# Patient Record
Sex: MALE | Race: WHITE | Employment: FULL TIME | URBAN - METROPOLITAN AREA
[De-identification: names, ages, dates, MRNs, and addresses within clinical notes are randomized per-mention and may not be internally consistent; named-entity substitution may affect disease eponyms.]

---

## 2019-03-29 ENCOUNTER — OFFICE VISIT (OUTPATIENT)
Dept: FAMILY MEDICINE CLINIC | Age: 54
End: 2019-03-29
Payer: COMMERCIAL

## 2019-03-29 VITALS
HEART RATE: 55 BPM | SYSTOLIC BLOOD PRESSURE: 138 MMHG | BODY MASS INDEX: 27.22 KG/M2 | HEIGHT: 72 IN | DIASTOLIC BLOOD PRESSURE: 86 MMHG | WEIGHT: 201 LBS

## 2019-03-29 DIAGNOSIS — E78.5 HYPERLIPIDEMIA, UNSPECIFIED HYPERLIPIDEMIA TYPE: ICD-10-CM

## 2019-03-29 DIAGNOSIS — N52.9 ERECTILE DYSFUNCTION, UNSPECIFIED ERECTILE DYSFUNCTION TYPE: Primary | ICD-10-CM

## 2019-03-29 DIAGNOSIS — I25.10 CORONARY ARTERY DISEASE WITHOUT ANGINA PECTORIS, UNSPECIFIED VESSEL OR LESION TYPE, UNSPECIFIED WHETHER NATIVE OR TRANSPLANTED HEART: ICD-10-CM

## 2019-03-29 DIAGNOSIS — K21.00 GASTROESOPHAGEAL REFLUX DISEASE WITH ESOPHAGITIS: ICD-10-CM

## 2019-03-29 LAB
A/G RATIO: 2 (ref 1.1–2.2)
ALBUMIN SERPL-MCNC: 4.5 G/DL (ref 3.4–5)
ALP BLD-CCNC: 73 U/L (ref 40–129)
ALT SERPL-CCNC: 22 U/L (ref 10–40)
ANION GAP SERPL CALCULATED.3IONS-SCNC: 12 MMOL/L (ref 3–16)
AST SERPL-CCNC: 28 U/L (ref 15–37)
BILIRUB SERPL-MCNC: 2 MG/DL (ref 0–1)
BUN BLDV-MCNC: 11 MG/DL (ref 7–20)
CALCIUM SERPL-MCNC: 9.4 MG/DL (ref 8.3–10.6)
CHLORIDE BLD-SCNC: 105 MMOL/L (ref 99–110)
CO2: 25 MMOL/L (ref 21–32)
CREAT SERPL-MCNC: 0.7 MG/DL (ref 0.9–1.3)
GFR AFRICAN AMERICAN: >60
GFR NON-AFRICAN AMERICAN: >60
GLOBULIN: 2.3 G/DL
GLUCOSE BLD-MCNC: 90 MG/DL (ref 70–99)
HCT VFR BLD CALC: 43.8 % (ref 40.5–52.5)
HEMOGLOBIN: 14.8 G/DL (ref 13.5–17.5)
MCH RBC QN AUTO: 28.2 PG (ref 26–34)
MCHC RBC AUTO-ENTMCNC: 33.7 G/DL (ref 31–36)
MCV RBC AUTO: 83.8 FL (ref 80–100)
PDW BLD-RTO: 13.4 % (ref 12.4–15.4)
PLATELET # BLD: 196 K/UL (ref 135–450)
PMV BLD AUTO: 8.9 FL (ref 5–10.5)
POTASSIUM SERPL-SCNC: 5.2 MMOL/L (ref 3.5–5.1)
RBC # BLD: 5.23 M/UL (ref 4.2–5.9)
SODIUM BLD-SCNC: 142 MMOL/L (ref 136–145)
TOTAL PROTEIN: 6.8 G/DL (ref 6.4–8.2)
TSH SERPL DL<=0.05 MIU/L-ACNC: 3.79 UIU/ML (ref 0.27–4.2)
WBC # BLD: 5.4 K/UL (ref 4–11)

## 2019-03-29 PROCEDURE — 1036F TOBACCO NON-USER: CPT | Performed by: FAMILY MEDICINE

## 2019-03-29 PROCEDURE — G8598 ASA/ANTIPLAT THER USED: HCPCS | Performed by: FAMILY MEDICINE

## 2019-03-29 PROCEDURE — G8419 CALC BMI OUT NRM PARAM NOF/U: HCPCS | Performed by: FAMILY MEDICINE

## 2019-03-29 PROCEDURE — 36415 COLL VENOUS BLD VENIPUNCTURE: CPT | Performed by: FAMILY MEDICINE

## 2019-03-29 PROCEDURE — 3017F COLORECTAL CA SCREEN DOC REV: CPT | Performed by: FAMILY MEDICINE

## 2019-03-29 PROCEDURE — G8427 DOCREV CUR MEDS BY ELIG CLIN: HCPCS | Performed by: FAMILY MEDICINE

## 2019-03-29 PROCEDURE — 99213 OFFICE O/P EST LOW 20 MIN: CPT | Performed by: FAMILY MEDICINE

## 2019-03-29 PROCEDURE — G8484 FLU IMMUNIZE NO ADMIN: HCPCS | Performed by: FAMILY MEDICINE

## 2019-03-29 RX ORDER — OMEPRAZOLE 20 MG/1
CAPSULE, DELAYED RELEASE ORAL
Refills: 11 | COMMUNITY
Start: 2019-03-26 | End: 2021-12-13

## 2019-03-29 SDOH — HEALTH STABILITY: MENTAL HEALTH: HOW MANY STANDARD DRINKS CONTAINING ALCOHOL DO YOU HAVE ON A TYPICAL DAY?: 1 OR 2

## 2019-03-29 SDOH — HEALTH STABILITY: MENTAL HEALTH: HOW OFTEN DO YOU HAVE A DRINK CONTAINING ALCOHOL?: 2-3 TIMES A WEEK

## 2019-03-29 ASSESSMENT — ENCOUNTER SYMPTOMS
BLOOD IN STOOL: 0
COUGH: 0
EYE DISCHARGE: 0
FACIAL SWELLING: 0
ANAL BLEEDING: 0
SINUS PRESSURE: 0
TROUBLE SWALLOWING: 0
SORE THROAT: 0
RHINORRHEA: 0
SHORTNESS OF BREATH: 0
NAUSEA: 0
ABDOMINAL PAIN: 0
DIARRHEA: 0
CHEST TIGHTNESS: 0
WHEEZING: 0
VOMITING: 0

## 2019-03-29 ASSESSMENT — PATIENT HEALTH QUESTIONNAIRE - PHQ9
1. LITTLE INTEREST OR PLEASURE IN DOING THINGS: 0
SUM OF ALL RESPONSES TO PHQ QUESTIONS 1-9: 0
SUM OF ALL RESPONSES TO PHQ9 QUESTIONS 1 & 2: 0
2. FEELING DOWN, DEPRESSED OR HOPELESS: 0
SUM OF ALL RESPONSES TO PHQ QUESTIONS 1-9: 0

## 2019-03-29 NOTE — PATIENT INSTRUCTIONS
Discuss PPI with GI. Keep evaluation next week with Cardiologist.   Labs were obtained and I will contact you via my chart. Continue to exercise and eat a balanced healthy diet. RTC in 3 months for follow up.

## 2019-03-29 NOTE — PROGRESS NOTES
3/29/2019    Nabila Flynn (:  1965) is a 49 y.o. male, presented to the clinic to establish care with a pcp and to discuss several ongoing concerns. He stated overall he feels well today. 1.  Nausea- patient stated that he follows with a GI specialist due to GERD and several months ago reduced his PPI to 20 mg a day. He stated that around this time he began to have occasional nausea, no vomiting or diarrhea. He is unable to distinguish if food helps or makes it worse. He does acknowledge it seems to occur more at night but he believes this is due to the pace of the day slowing down. He has thought perhaps increasing his PPI again would help but is concerned about side effects associated with the medication. 2.  CAD- follows with a Cardiologist.  Has a stent in left circumflex coronary artery and is stable at this time. Is wanting a new prescription for Nitroglycerin and has a follow up appointment with his specialist next week. 3.  ED- patient stated that this has been a developing issue. He started noticing that he is having problems obtaining an erection and maintaining one. He denied pain or discomfort with penis or testicles. Denied history of STD and denied lesion. He believes it is associated with his age. He has good muscle mass and hair growth. Today, he denied chest pain, sob,n, v, or diarrhea. HPI    Review of Systems   Constitutional: Negative for activity change, fatigue, fever and unexpected weight change. HENT: Negative for congestion, ear pain, facial swelling, hearing loss, rhinorrhea, sinus pressure, sore throat and trouble swallowing. Eyes: Negative for discharge and visual disturbance. Respiratory: Negative for cough, chest tightness, shortness of breath and wheezing. Cardiovascular: Negative for chest pain, palpitations and leg swelling. Gastrointestinal: Negative for abdominal pain, anal bleeding, blood in stool, diarrhea, nausea and vomiting. Endocrine: Negative for cold intolerance, heat intolerance, polydipsia and polyphagia. Genitourinary: Positive for urgency. Negative for decreased urine volume, discharge, dysuria, frequency, genital sores, penile pain and testicular pain. Musculoskeletal: Negative for arthralgias. Skin: Negative for rash. Allergic/Immunologic: Negative for food allergies. Neurological: Negative for dizziness, syncope, light-headedness and headaches. Hematological: Does not bruise/bleed easily. Psychiatric/Behavioral: Negative for suicidal ideas. The patient is not nervous/anxious. Prior to Visit Medications    Medication Sig Taking? Authorizing Provider   omeprazole (PRILOSEC) 20 MG delayed release capsule TK 1 C PO D Yes Historical Provider, MD   rosuvastatin (CRESTOR) 40 MG tablet Take 0.5 tablets by mouth daily. Yes Renuka Ruffin MD   ezetimibe (ZETIA) 10 MG tablet Take 10 mg by mouth daily. Yes Historical Provider, MD   aspirin 81 MG EC tablet Take 81 mg by mouth daily. Yes Historical Provider, MD   Cetirizine HCl (ZYRTEC PO) Take  by mouth. Yes Historical Provider, MD        No Known Allergies    Past Medical History:   Diagnosis Date    CAD (coronary artery disease) 4/10    ptca LAD Pelberg stent    Hyperlipidemia        Past Surgical History:   Procedure Laterality Date    APPENDECTOMY         Social History     Socioeconomic History    Marital status:      Spouse name: Not on file    Number of children: Not on file    Years of education: Not on file    Highest education level: Not on file   Occupational History    Not on file   Social Needs    Financial resource strain: Not on file    Food insecurity:     Worry: Not on file     Inability: Not on file    Transportation needs:     Medical: Not on file     Non-medical: Not on file   Tobacco Use    Smoking status: Never Smoker    Smokeless tobacco: Never Used   Substance and Sexual Activity    Alcohol use:  Yes     Alcohol/week: 2.5 oz     Types: 5 Standard drinks or equivalent per week     Frequency: 2-3 times a week     Drinks per session: 1 or 2    Drug use: No    Sexual activity: Yes     Partners: Female   Lifestyle    Physical activity:     Days per week: Not on file     Minutes per session: Not on file    Stress: Not on file   Relationships    Social connections:     Talks on phone: Not on file     Gets together: Not on file     Attends Latter-day service: Not on file     Active member of club or organization: Not on file     Attends meetings of clubs or organizations: Not on file     Relationship status: Not on file    Intimate partner violence:     Fear of current or ex partner: Not on file     Emotionally abused: Not on file     Physically abused: Not on file     Forced sexual activity: Not on file   Other Topics Concern    Not on file   Social History Narrative    Not on file        Family History   Problem Relation Age of Onset    Cancer Mother         skin    High Cholesterol Mother     Other Sister         spinal stenosis       Vitals:    03/29/19 1121   BP: 138/86   Pulse: 55   Weight: 201 lb (91.2 kg)   Height: 6' (1.829 m)     Estimated body mass index is 27.26 kg/m² as calculated from the following:    Height as of this encounter: 6' (1.829 m). Weight as of this encounter: 201 lb (91.2 kg).       Chemistry        Component Value Date/Time     04/20/2010 1535    K 3.7 04/20/2010 1535     04/20/2010 1535    CO2 25 04/20/2010 1535    BUN 11 04/20/2010 1535    CREATININE 0.8 (L) 04/20/2010 1535        Component Value Date/Time    CALCIUM 9.4 04/20/2010 1535          Lab Results   Component Value Date    WBC 6.9 04/20/2010    HGB 14.2 04/20/2010    HCT 41.1 04/20/2010    MCV 84.1 04/20/2010     04/20/2010     No results found for: LABA1C  No results found for: EAG  No results found for: LABA1C  No components found for: CHLPL  No results found for: TRIG  No results found for: HDL  No results found follow-ups on file.

## 2019-06-28 ENCOUNTER — OFFICE VISIT (OUTPATIENT)
Dept: FAMILY MEDICINE CLINIC | Age: 54
End: 2019-06-28
Payer: COMMERCIAL

## 2019-06-28 VITALS
WEIGHT: 200.8 LBS | DIASTOLIC BLOOD PRESSURE: 86 MMHG | SYSTOLIC BLOOD PRESSURE: 122 MMHG | HEIGHT: 72 IN | BODY MASS INDEX: 27.2 KG/M2

## 2019-06-28 DIAGNOSIS — Z00.00 EXAMINATION, MEDICAL, GENERAL: Primary | ICD-10-CM

## 2019-06-28 DIAGNOSIS — R53.83 FATIGUE, UNSPECIFIED TYPE: ICD-10-CM

## 2019-06-28 DIAGNOSIS — E78.5 HYPERLIPIDEMIA, UNSPECIFIED HYPERLIPIDEMIA TYPE: ICD-10-CM

## 2019-06-28 DIAGNOSIS — I25.10 CORONARY ARTERY DISEASE WITHOUT ANGINA PECTORIS, UNSPECIFIED VESSEL OR LESION TYPE, UNSPECIFIED WHETHER NATIVE OR TRANSPLANTED HEART: ICD-10-CM

## 2019-06-28 LAB
ANION GAP SERPL CALCULATED.3IONS-SCNC: 13 MMOL/L (ref 3–16)
BUN BLDV-MCNC: 13 MG/DL (ref 7–20)
CALCIUM SERPL-MCNC: 9.8 MG/DL (ref 8.3–10.6)
CHLORIDE BLD-SCNC: 102 MMOL/L (ref 99–110)
CHOLESTEROL, TOTAL: 174 MG/DL (ref 0–199)
CO2: 26 MMOL/L (ref 21–32)
CREAT SERPL-MCNC: 0.7 MG/DL (ref 0.9–1.3)
GFR AFRICAN AMERICAN: >60
GFR NON-AFRICAN AMERICAN: >60
GLUCOSE BLD-MCNC: 103 MG/DL (ref 70–99)
HCT VFR BLD CALC: 43.7 % (ref 40.5–52.5)
HDLC SERPL-MCNC: 53 MG/DL (ref 40–60)
HEMOGLOBIN: 15.3 G/DL (ref 13.5–17.5)
LDL CHOLESTEROL CALCULATED: 107 MG/DL
MCH RBC QN AUTO: 29.3 PG (ref 26–34)
MCHC RBC AUTO-ENTMCNC: 35 G/DL (ref 31–36)
MCV RBC AUTO: 83.9 FL (ref 80–100)
PDW BLD-RTO: 13.1 % (ref 12.4–15.4)
PLATELET # BLD: 180 K/UL (ref 135–450)
PMV BLD AUTO: 9 FL (ref 5–10.5)
POTASSIUM SERPL-SCNC: 5 MMOL/L (ref 3.5–5.1)
RBC # BLD: 5.21 M/UL (ref 4.2–5.9)
SODIUM BLD-SCNC: 141 MMOL/L (ref 136–145)
TRIGL SERPL-MCNC: 68 MG/DL (ref 0–150)
VLDLC SERPL CALC-MCNC: 14 MG/DL
WBC # BLD: 5.9 K/UL (ref 4–11)

## 2019-06-28 PROCEDURE — 36415 COLL VENOUS BLD VENIPUNCTURE: CPT | Performed by: FAMILY MEDICINE

## 2019-06-28 PROCEDURE — 99396 PREV VISIT EST AGE 40-64: CPT | Performed by: FAMILY MEDICINE

## 2019-06-28 RX ORDER — ERGOCALCIFEROL 1.25 MG/1
50000 CAPSULE ORAL WEEKLY
Qty: 4 CAPSULE | Refills: 1 | Status: SHIPPED | OUTPATIENT
Start: 2019-06-28 | End: 2022-08-01

## 2019-06-28 RX ORDER — ROSUVASTATIN CALCIUM 40 MG/1
20 TABLET, COATED ORAL DAILY
Qty: 30 TABLET | Refills: 3 | Status: SHIPPED | OUTPATIENT
Start: 2019-06-28 | End: 2019-10-18

## 2019-06-28 RX ORDER — EZETIMIBE 10 MG/1
10 TABLET ORAL DAILY
Qty: 30 TABLET | Refills: 3 | Status: SHIPPED | OUTPATIENT
Start: 2019-06-28 | End: 2019-09-24 | Stop reason: SDUPTHER

## 2019-06-28 ASSESSMENT — ENCOUNTER SYMPTOMS
CHEST TIGHTNESS: 0
FACIAL SWELLING: 0
SHORTNESS OF BREATH: 0
DIARRHEA: 0
NAUSEA: 0
ANAL BLEEDING: 0
EYE DISCHARGE: 0
TROUBLE SWALLOWING: 0
COUGH: 0
SORE THROAT: 0
SINUS PRESSURE: 0
RHINORRHEA: 1
WHEEZING: 0
VOMITING: 0
ABDOMINAL PAIN: 0
BLOOD IN STOOL: 0

## 2019-06-28 ASSESSMENT — PATIENT HEALTH QUESTIONNAIRE - PHQ9
SUM OF ALL RESPONSES TO PHQ QUESTIONS 1-9: 0
SUM OF ALL RESPONSES TO PHQ QUESTIONS 1-9: 0
SUM OF ALL RESPONSES TO PHQ9 QUESTIONS 1 & 2: 0
2. FEELING DOWN, DEPRESSED OR HOPELESS: 0
1. LITTLE INTEREST OR PLEASURE IN DOING THINGS: 0

## 2019-06-28 NOTE — PATIENT INSTRUCTIONS
Thank you for coming. Labs were obtained and I will contact you with results. Continue to eat a balanced healthy diet.    Continue to follow up with your Cardiologist.   Please rtc in one year for physical.

## 2019-06-28 NOTE — PROGRESS NOTES
2019     Luis Miguel Bustamante (:  1965) is a 49 y.o. male, here for his yearly physical.   Patient feels well with no new complaints. Follows every six months with his Cardiologist due to past stent. Evaluated with Urology next month. Today, he denied chest pain, sob, n,v, or diarrhea. HPI    Review of Systems   Constitutional: Positive for fatigue. Negative for activity change, fever and unexpected weight change. HENT: Positive for rhinorrhea. Negative for congestion, ear pain, facial swelling, hearing loss, sinus pressure, sore throat and trouble swallowing. Eyes: Negative for discharge and visual disturbance. Respiratory: Negative for cough, chest tightness, shortness of breath and wheezing. Cardiovascular: Negative for chest pain, palpitations and leg swelling. Gastrointestinal: Negative for abdominal pain, anal bleeding, blood in stool, diarrhea, nausea and vomiting. Endocrine: Negative for cold intolerance, heat intolerance, polydipsia and polyphagia. Genitourinary: Negative for decreased urine volume, discharge, dysuria, frequency, genital sores, penile pain, testicular pain and urgency. Musculoskeletal: Negative for arthralgias. Skin: Negative for rash. Neurological: Negative for dizziness, syncope, light-headedness and headaches. Psychiatric/Behavioral: Negative for suicidal ideas. The patient is not nervous/anxious. Prior to Visit Medications    Medication Sig Taking? Authorizing Provider   ezetimibe (ZETIA) 10 MG tablet Take 1 tablet by mouth daily Yes Júnior Fernandez, DO   rosuvastatin (CRESTOR) 40 MG tablet Take 0.5 tablets by mouth daily Yes Júnior Fernandez DO   vitamin D (ERGOCALCIFEROL) 66253 units CAPS capsule Take 1 capsule by mouth once a week for 8 doses Yes Júnior Fernandez, DO   omeprazole (PRILOSEC) 20 MG delayed release capsule TK 1 C PO D Yes Historical Provider, MD   aspirin 81 MG EC tablet Take 81 mg by mouth daily.    Yes Historical Provider, MD   Cetirizine HCl (ZYRTEC PO) Take  by mouth. Yes Historical Provider, MD        Social History     Tobacco Use    Smoking status: Never Smoker    Smokeless tobacco: Never Used   Substance Use Topics    Alcohol use: Yes     Alcohol/week: 2.5 oz     Types: 5 Standard drinks or equivalent per week     Frequency: 2-3 times a week     Drinks per session: 1 or 2        Vitals:    06/28/19 1034   BP: 122/86   Weight: 200 lb 12.8 oz (91.1 kg)   Height: 6' (1.829 m)     Estimated body mass index is 27.23 kg/m² as calculated from the following:    Height as of this encounter: 6' (1.829 m). Weight as of this encounter: 200 lb 12.8 oz (91.1 kg). Physical Exam   Constitutional: He is oriented to person, place, and time. He appears well-developed and well-nourished. HENT:   Head: Normocephalic and atraumatic. Right Ear: External ear normal.   Left Ear: External ear normal.   Mouth/Throat: Oropharynx is clear and moist.   Eyes: Pupils are equal, round, and reactive to light. Conjunctivae are normal.   Neck: Neck supple. No thyromegaly present. Cardiovascular: Normal rate, regular rhythm and normal heart sounds. No murmur heard. Pulmonary/Chest: Effort normal and breath sounds normal. He has no wheezes. He has no rales. Abdominal: Soft. Bowel sounds are normal. There is no tenderness. Genitourinary:   Genitourinary Comments: Deferred- urology appointment coming up   Musculoskeletal: Normal range of motion. He exhibits no edema. Lymphadenopathy:     He has no cervical adenopathy. Neurological: He is alert and oriented to person, place, and time. He displays normal reflexes. No cranial nerve deficit. Skin: No rash noted. Psychiatric: He has a normal mood and affect. His behavior is normal. Judgment and thought content normal.       ASSESSMENT/PLAN:  1. Examination, medical, general  Labs obtained.   Discussed diet and exercised  Discussed MILADY as well as PSA- has Urology appointment next month and will wait until then. Educated on the need for Shingles vaccination  Answered questions. - Lipid Panel  - CBC  - Basic Metabolic Panel    2. Coronary artery disease without angina pectoris, unspecified vessel or lesion type, unspecified whether native or transplanted heart  Stable  Follow up with Urologist.     3. Hyperlipidemia, unspecified hyperlipidemia type  Discussed the need to exercise 30 minutes each day. Monitor diet and push water. Reduce refined carbs and replace with fiber and vegetables. Use medication as prescribed. 4. Fatigue, unspecified type  Discussed sleep hygiene  Obtain labs  RTC in one year for his yearly  RTC if need otherwise continue to follow with Cardiology Q 6 months. Return in about 1 year (around 6/28/2020).

## 2019-07-31 ENCOUNTER — TELEPHONE (OUTPATIENT)
Dept: FAMILY MEDICINE CLINIC | Age: 54
End: 2019-07-31

## 2019-07-31 NOTE — TELEPHONE ENCOUNTER
Patient is calling to find out if the Crestor is 40mg or is it 20mg that he is suppose to take. Please call back.

## 2019-09-25 RX ORDER — EZETIMIBE 10 MG/1
10 TABLET ORAL DAILY
Qty: 90 TABLET | Refills: 0 | Status: SHIPPED | OUTPATIENT
Start: 2019-09-25 | End: 2019-12-19

## 2019-10-18 ENCOUNTER — TELEPHONE (OUTPATIENT)
Dept: FAMILY MEDICINE CLINIC | Age: 54
End: 2019-10-18

## 2019-10-18 RX ORDER — ROSUVASTATIN CALCIUM 20 MG/1
20 TABLET, COATED ORAL DAILY
Qty: 30 TABLET | Refills: 5 | Status: SHIPPED | OUTPATIENT
Start: 2019-10-18 | End: 2020-01-31

## 2019-12-19 RX ORDER — EZETIMIBE 10 MG/1
10 TABLET ORAL DAILY
Qty: 90 TABLET | Refills: 0 | Status: SHIPPED | OUTPATIENT
Start: 2019-12-19 | End: 2020-01-31

## 2020-01-31 RX ORDER — EZETIMIBE 10 MG/1
10 TABLET ORAL DAILY
Qty: 90 TABLET | Refills: 0 | Status: SHIPPED | OUTPATIENT
Start: 2020-01-31 | End: 2020-06-26

## 2020-01-31 RX ORDER — ROSUVASTATIN CALCIUM 20 MG/1
20 TABLET, COATED ORAL DAILY
Qty: 90 TABLET | Refills: 1 | Status: SHIPPED | OUTPATIENT
Start: 2020-01-31 | End: 2020-09-21

## 2020-06-26 RX ORDER — EZETIMIBE 10 MG/1
10 TABLET ORAL DAILY
Qty: 90 TABLET | Refills: 0 | Status: SHIPPED | OUTPATIENT
Start: 2020-06-26 | End: 2020-09-21

## 2020-09-21 RX ORDER — ROSUVASTATIN CALCIUM 20 MG/1
20 TABLET, COATED ORAL DAILY
Qty: 90 TABLET | Refills: 3 | Status: SHIPPED | OUTPATIENT
Start: 2020-09-21 | End: 2021-08-09

## 2020-09-21 RX ORDER — EZETIMIBE 10 MG/1
10 TABLET ORAL DAILY
Qty: 90 TABLET | Refills: 3 | Status: SHIPPED | OUTPATIENT
Start: 2020-09-21 | End: 2021-08-09

## 2020-12-28 LAB — MAGNESIUM: 2.3 MG/DL (ref 1.8–2.4)

## 2021-08-09 RX ORDER — ROSUVASTATIN CALCIUM 20 MG/1
20 TABLET, COATED ORAL DAILY
Qty: 90 TABLET | Refills: 3 | Status: SHIPPED | OUTPATIENT
Start: 2021-08-09 | End: 2022-01-31 | Stop reason: SDUPTHER

## 2021-08-09 RX ORDER — EZETIMIBE 10 MG/1
10 TABLET ORAL DAILY
Qty: 90 TABLET | Refills: 3 | Status: SHIPPED | OUTPATIENT
Start: 2021-08-09 | End: 2022-08-01 | Stop reason: SDUPTHER

## 2021-11-08 ENCOUNTER — OFFICE VISIT (OUTPATIENT)
Dept: PRIMARY CARE CLINIC | Age: 56
End: 2021-11-08
Payer: COMMERCIAL

## 2021-11-08 VITALS — OXYGEN SATURATION: 98 % | SYSTOLIC BLOOD PRESSURE: 128 MMHG | HEART RATE: 62 BPM | DIASTOLIC BLOOD PRESSURE: 74 MMHG

## 2021-11-08 DIAGNOSIS — Z23 NEEDS FLU SHOT: ICD-10-CM

## 2021-11-08 DIAGNOSIS — E78.00 PURE HYPERCHOLESTEROLEMIA: ICD-10-CM

## 2021-11-08 DIAGNOSIS — Z00.00 ROUTINE ADULT HEALTH MAINTENANCE: ICD-10-CM

## 2021-11-08 DIAGNOSIS — M54.12 RIGHT CERVICAL RADICULOPATHY: Primary | ICD-10-CM

## 2021-11-08 DIAGNOSIS — I25.10 CORONARY ARTERY DISEASE INVOLVING NATIVE CORONARY ARTERY OF NATIVE HEART WITHOUT ANGINA PECTORIS: ICD-10-CM

## 2021-11-08 DIAGNOSIS — Z23 NEED FOR TDAP VACCINATION: ICD-10-CM

## 2021-11-08 PROCEDURE — G8427 DOCREV CUR MEDS BY ELIG CLIN: HCPCS | Performed by: INTERNAL MEDICINE

## 2021-11-08 PROCEDURE — 90674 CCIIV4 VAC NO PRSV 0.5 ML IM: CPT | Performed by: INTERNAL MEDICINE

## 2021-11-08 PROCEDURE — 1036F TOBACCO NON-USER: CPT | Performed by: INTERNAL MEDICINE

## 2021-11-08 PROCEDURE — 90471 IMMUNIZATION ADMIN: CPT | Performed by: INTERNAL MEDICINE

## 2021-11-08 PROCEDURE — 3017F COLORECTAL CA SCREEN DOC REV: CPT | Performed by: INTERNAL MEDICINE

## 2021-11-08 PROCEDURE — G8421 BMI NOT CALCULATED: HCPCS | Performed by: INTERNAL MEDICINE

## 2021-11-08 PROCEDURE — 90715 TDAP VACCINE 7 YRS/> IM: CPT | Performed by: INTERNAL MEDICINE

## 2021-11-08 PROCEDURE — 90472 IMMUNIZATION ADMIN EACH ADD: CPT | Performed by: INTERNAL MEDICINE

## 2021-11-08 PROCEDURE — G8484 FLU IMMUNIZE NO ADMIN: HCPCS | Performed by: INTERNAL MEDICINE

## 2021-11-08 PROCEDURE — 99203 OFFICE O/P NEW LOW 30 MIN: CPT | Performed by: INTERNAL MEDICINE

## 2021-11-08 RX ORDER — TADALAFIL 20 MG/1
20 TABLET ORAL DAILY PRN
Qty: 10 TABLET | Refills: 1 | Status: SHIPPED | OUTPATIENT
Start: 2021-11-08 | End: 2022-01-31

## 2021-11-08 ASSESSMENT — PATIENT HEALTH QUESTIONNAIRE - PHQ9
SUM OF ALL RESPONSES TO PHQ QUESTIONS 1-9: 0
1. LITTLE INTEREST OR PLEASURE IN DOING THINGS: 0
2. FEELING DOWN, DEPRESSED OR HOPELESS: 0
SUM OF ALL RESPONSES TO PHQ QUESTIONS 1-9: 0
SUM OF ALL RESPONSES TO PHQ9 QUESTIONS 1 & 2: 0
SUM OF ALL RESPONSES TO PHQ QUESTIONS 1-9: 0

## 2021-11-08 NOTE — PROGRESS NOTES
2021    Arun Khan (:  1965) is a 55 y.o. male, here for evaluation of the following medical concerns:    Chief Complaint   Patient presents with    Establish Care       HPI  71-year-old white male last seen by his former PCP in 2019, with history of coronary artery disease GERD c/b Barretts followed in GI, BPH followed in urology rhinitis D deficiency who comes in to establish care following his providers relocation. Have not seen his cardiologist at the [facility] Dr. Concepcion Silva since 2019. pCircumflex PCI GLORIA . Negative stress echo 2018, achieving nearly 13 METS. Cardiologist provided prescription for Cialis in 2019 for new onset ED. Review of Systems   Constitutional: Negative for activity change, appetite change, fatigue and unexpected weight change. HENT: Negative for dental problem, sinus pain, sore throat and trouble swallowing. Eyes: Negative for pain and visual disturbance. Respiratory: Negative for apnea, cough, chest tightness, shortness of breath and wheezing. Cardiovascular: Negative for chest pain and palpitations. Gastrointestinal: Negative for abdominal pain, blood in stool, constipation, diarrhea, nausea, rectal pain and vomiting. Endocrine: Negative for cold intolerance, heat intolerance, polydipsia, polyphagia and polyuria. Genitourinary: Negative for difficulty urinating, dysuria, flank pain, frequency, hematuria, pelvic pain, urgency, vaginal bleeding and vaginal discharge. Musculoskeletal: Negative for arthralgias, back pain, gait problem, joint swelling, myalgias, neck pain and neck stiffness. Skin: Negative for color change and rash. Neurological: Negative for dizziness, tremors, syncope, speech difficulty, weakness, light-headedness and headaches. Hematological: Negative for adenopathy. Does not bruise/bleed easily.    Psychiatric/Behavioral: Negative for agitation, behavioral problems, decreased concentration, sleep disturbance and file   Physical Activity:     Days of Exercise per Week: Not on file    Minutes of Exercise per Session: Not on file   Stress:     Feeling of Stress : Not on file   Social Connections:     Frequency of Communication with Friends and Family: Not on file    Frequency of Social Gatherings with Friends and Family: Not on file    Attends Rastafari Services: Not on file    Active Member of [employer] or Organizations: Not on file    Attends Club or Organization Meetings: Not on file    Marital Status: Not on file   Intimate Partner Violence:     Fear of Current or Ex-Partner: Not on file    Emotionally Abused: Not on file    Physically Abused: Not on file    Sexually Abused: Not on file   Housing Stability:     Unable to Pay for Housing in the Last Year: Not on file    Number of [place] in the Last Year: Not on file    Unstable Housing in the Last Year: Not on file        Family History   Problem Relation Age of Onset    Cancer Mother         skin    High Cholesterol Mother     Other Sister         spinal stenosis    Coronary Art Dis Father        Vitals:    11/08/21 0836   BP: 128/74   Pulse: 62   SpO2: 98%     Estimated body mass index is 27.23 kg/m² as calculated from the following:    Height as of 6/28/19: 6' (1.829 m). Weight as of 6/28/19: 200 lb 12.8 oz (91.1 kg). PHYSICAL EXAM  GENERAL:  Pleasant  male who looks his stated age, awake alert and oriented x3, no acute distress. NMSK: Muscle deficit, altered sensation dorsum of the right forearm and right shoulder Consistent with C6-C7 deficit. Pulses symmetrically intact. Negative Adson's maneuver. SKIN: Jarquin type II skin. Freckles. Back SK. Stork bite  PSYCH:  No psychomotor retardation or agitation. Good eye contact. Unrestricted affect range. Mood congruent with affect. Linear thought.     LABS  Lab Review   Orders Only on 12/28/2020   Component Date Value    Magnesium 12/28/2020 2.30 ASSESSMENT/PLAN  1. Right cervical radiculopathy  C67 distribution, previously improved with PT before declines Neurontin though scratching at the skin a fair amount try PT again document ligamentous stability obtain confirmatory EMG and have him follow-up with orthospine after course of PT.  - XR CERVICAL SPINE FLEXION AND EXTENSION; Future  - [address]  - EMG; Future  - [facility]    2. Coronary artery disease involving native coronary artery of native heart without angina pectoris  Asymptomatic currently. - Basic Metabolic Panel; Future    3. Pure hypercholesterolemia  On medium dose Crestor and Zetia, LDL well above 100 on last check, asked him to discuss with his cardiologist Crestor intensification. He does not recall intolerance to high-dose Crestor.  - Lipid Panel; Future  - AST; Future  - ALT; Future    4. Routine adult health maintenance  Tdap and influenza due, provided today. Shingrix Covid up-to-date. Normal colonoscopy December 2012, repeat December 2022.  - TSH with Reflex; Future  - Vitamin D 25 Hydroxy; Future  - Vitamin B12; Future  - Hep C AB RLFX HCV PCR-A; Future    5. Erectile dysfunction. Refilled Cialis. Introduced the idea of taking daily low-dose, in setting of BPH may be of interest.    6.  BPH. Nocturia x3. Reviewed nocturnal hygiene. Not currently on any treatment. Follows in urology. Will not order PSA as they likely will. 7.  GERD. Atypical chest pain since 2015 unrelated to meals. Dilated, no red flag symptoms compliant with Prilosec. Dr. Joe Avendaño  No follow-ups on file. It was a pleasure to visit with  Kristaismael Asencio today. Answered all questions as best I could.   Morgan Morrison MD   Time 40 minutes

## 2021-11-08 NOTE — PATIENT INSTRUCTIONS
1. Legs elevated hour or 2 before bedtime  2. Fasting blood work tomorrow  3. Tdap and flu rainer today  4. Would leave the seborrheic keratosis alone, on your back  5. OK to f/u Dr David Gruber  6. Cologuard covered by insurance? 7. Dr Tanya Delcid misses you, ask if candidate for crestor 40mg in addition to zetia for maximum CV disease prophlaxis. 8. Let me know if desire trial of carafate  9.  Plain films + EMG/NCS + PT see ortho,

## 2021-11-11 DIAGNOSIS — Z00.00 ROUTINE ADULT HEALTH MAINTENANCE: ICD-10-CM

## 2021-11-11 DIAGNOSIS — E78.00 PURE HYPERCHOLESTEROLEMIA: ICD-10-CM

## 2021-11-11 DIAGNOSIS — I25.10 CORONARY ARTERY DISEASE INVOLVING NATIVE CORONARY ARTERY OF NATIVE HEART WITHOUT ANGINA PECTORIS: ICD-10-CM

## 2021-11-11 LAB
ALT SERPL-CCNC: 23 U/L (ref 10–40)
ANION GAP SERPL CALCULATED.3IONS-SCNC: 12 MMOL/L (ref 3–16)
AST SERPL-CCNC: 27 U/L (ref 15–37)
BUN BLDV-MCNC: 16 MG/DL (ref 7–20)
CALCIUM SERPL-MCNC: 9.6 MG/DL (ref 8.3–10.6)
CHLORIDE BLD-SCNC: 101 MMOL/L (ref 99–110)
CHOLESTEROL, TOTAL: 125 MG/DL (ref 0–199)
CO2: 26 MMOL/L (ref 21–32)
CREAT SERPL-MCNC: 0.8 MG/DL (ref 0.9–1.3)
GFR AFRICAN AMERICAN: >60
GFR NON-AFRICAN AMERICAN: >60
GLUCOSE BLD-MCNC: 102 MG/DL (ref 70–99)
HDLC SERPL-MCNC: 46 MG/DL (ref 40–60)
LDL CHOLESTEROL CALCULATED: 67 MG/DL
POTASSIUM SERPL-SCNC: 4.3 MMOL/L (ref 3.5–5.1)
SODIUM BLD-SCNC: 139 MMOL/L (ref 136–145)
TRIGL SERPL-MCNC: 58 MG/DL (ref 0–150)
TSH REFLEX: 3.23 UIU/ML (ref 0.27–4.2)
VITAMIN B-12: 682 PG/ML (ref 211–911)
VITAMIN D 25-HYDROXY: 44 NG/ML
VLDLC SERPL CALC-MCNC: 12 MG/DL

## 2021-11-13 LAB
HEPATITIS C VIRUS AB BY CIA INDEX: 0.06 IV
HEPATITIS C VIRUS AB BY CIA INTERPRETATION: NEGATIVE

## 2021-11-23 ENCOUNTER — OFFICE VISIT (OUTPATIENT)
Dept: ORTHOPEDIC SURGERY | Age: 56
End: 2021-11-23
Payer: COMMERCIAL

## 2021-11-23 ENCOUNTER — HOSPITAL ENCOUNTER (OUTPATIENT)
Dept: PHYSICAL THERAPY | Age: 56
Setting detail: THERAPIES SERIES
Discharge: HOME | End: 2021-11-23
Payer: COMMERCIAL

## 2021-11-23 VITALS — HEIGHT: 72 IN | BODY MASS INDEX: 27.09 KG/M2 | WEIGHT: 200 LBS

## 2021-11-23 DIAGNOSIS — M54.2 NECK PAIN: Primary | ICD-10-CM

## 2021-11-23 DIAGNOSIS — M47.22 CERVICAL SPONDYLOSIS WITH RADICULOPATHY: ICD-10-CM

## 2021-11-23 PROCEDURE — G8482 FLU IMMUNIZE ORDER/ADMIN: HCPCS | Performed by: ORTHOPAEDIC SURGERY

## 2021-11-23 PROCEDURE — G8419 CALC BMI OUT NRM PARAM NOF/U: HCPCS | Performed by: ORTHOPAEDIC SURGERY

## 2021-11-23 PROCEDURE — 97140 MANUAL THERAPY 1/> REGIONS: CPT

## 2021-11-23 PROCEDURE — 3017F COLORECTAL CA SCREEN DOC REV: CPT | Performed by: ORTHOPAEDIC SURGERY

## 2021-11-23 PROCEDURE — G8427 DOCREV CUR MEDS BY ELIG CLIN: HCPCS | Performed by: ORTHOPAEDIC SURGERY

## 2021-11-23 PROCEDURE — 97012 MECHANICAL TRACTION THERAPY: CPT

## 2021-11-23 PROCEDURE — 99204 OFFICE O/P NEW MOD 45 MIN: CPT | Performed by: ORTHOPAEDIC SURGERY

## 2021-11-23 PROCEDURE — 97161 PT EVAL LOW COMPLEX 20 MIN: CPT

## 2021-11-23 PROCEDURE — 1036F TOBACCO NON-USER: CPT | Performed by: ORTHOPAEDIC SURGERY

## 2021-11-23 NOTE — PROGRESS NOTES
New Patient: CERVICAL SPINE    Referring Provider:  Christine Jain MD    CHIEF COMPLAINT:    Chief Complaint   Patient presents with    Neck Pain     Patient states that he has had issues with his neck and arm on and off for the last several years but progressively worse in last few months. Neck pain and right shoulder pain and itching and weakness of the right arm. Also notes some weakness and pain in left arm. He has tried massage therapy with good relief. HISTORY OF PRESENT ILLNESS:   Mr. Delfino Angel is a pleasant 55 y.o. old L, referred by Dr. Orlando Diaz for the evaluation of neck and right greater than left arm pain. He notes his symptoms began insidiously several years ago. They have steadily increased since that time. He rates his neck and arm pain 4/10. Also has 4/10 low back pain. He notes numbness and tingling of his right arm and weakness of his left arm he denies loss of fine motor control or gait abnormality.    Cervical extension causes his right arm pain      Current/Past Treatment:   · Physical Therapy: None  · Message Therapy helpful  · Chiropractic: None  · Injection: None  · Medications: None    Past Medical History:   Past Medical History:   Diagnosis Date    CAD (coronary artery disease) 4/10    ptca LAD Pelberg stent    Hyperlipidemia       Past Surgical History:     Past Surgical History:   Procedure Laterality Date    APPENDECTOMY       Current Medications:     Current Outpatient Medications:     tadalafil (CIALIS) 20 MG tablet, Take 1 tablet by mouth daily as needed for Erectile Dysfunction, Disp: 10 tablet, Rfl: 1    ezetimibe (ZETIA) 10 MG tablet, TAKE 1 TABLET BY MOUTH  DAILY, Disp: 90 tablet, Rfl: 3    rosuvastatin (CRESTOR) 20 MG tablet, TAKE 1 TABLET BY MOUTH  DAILY, Disp: 90 tablet, Rfl: 3    vitamin D (ERGOCALCIFEROL) 27055 units CAPS capsule, Take 1 capsule by mouth once a week for 8 doses, Disp: 4 capsule, Rfl: 1    omeprazole (PRILOSEC) 20 MG delayed release capsule, TK 1 C PO D, Disp: , Rfl: 11    aspirin 81 MG EC tablet, Take 81 mg by mouth daily. , Disp: , Rfl:     Cetirizine HCl (ZYRTEC PO), Take  by mouth.  , Disp: , Rfl:   Allergies:  Patient has no known allergies. Social History:    reports that he has never smoked. He has never used smokeless tobacco. He reports current alcohol use of about 4.2 standard drinks of alcohol per week. He reports that he does not use drugs. Family History:   Family History   Problem Relation Age of Onset    Cancer Mother         skin    High Cholesterol Mother     Other Sister         spinal stenosis    Coronary Art Dis Father        REVIEW OF SYSTEMS: Full ROS noted & scanned   CONSTITUTIONAL: Denies unexplained weight loss, fevers, chills or fatigue  NEUROLOGICAL: Denies unsteady gait or progressive weakness  MUSCULOSKELETAL: Denies joint swelling or redness  PSYCHOLOGICAL: Denies anxiety, depression   SKIN: Denies skin changes, delayed healing, rash, itching   HEMATOLOGIC: Denies easy bleeding or bruising  ENDOCRINE: Denies excessive thirst, urination, heat/cold  RESPIRATORY: Denies current dyspnea, cough  GI: Denies nausea, vomiting, diarrhea   : Denies bowel or bladder issues       PHYSICAL EXAM:    Vitals: Height 6' (1.829 m), weight 200 lb (90.7 kg). GENERAL EXAM:  · General Apparence: Patient is adequately groomed with no evidence of malnutrition. · Orientation: The patient is oriented to time, place and person. · Mood & Affect:The patient's mood and affect are appropriate   · Vascular: Examination reveals no swelling tenderness in upper or lower extremities. Good capillary refill  · Lymphatic: The lymphatic examination bilaterally reveals all areas to be without enlargement or induration  · Sensation: Sensation is intact without deficit  · Coordination/Balance: Good coordination     CERVICAL EXAMINATION:  · Inspection: Local inspection shows no step-off or bruising.   Cervical alignment is normal.

## 2021-11-23 NOTE — FLOWSHEET NOTE
[facility] [place] [address]  Phone: [phone]   Fax:     [phone]    Physical Therapy Treatment Note/ Progress Report:     Date:  2021    Patient Name:  Alejandro Deras    :  1965  MRN: [id number]    Pertinent Medical History:  CAD, Hyperlipidemia    Medical/Treatment Diagnosis Information:  Diagnosis: M54.12 (ICD-10-CM) - Right cervical radiculopathy  Treatment Diagnosis: Decreased sensation in R UE. Radiating pain, limited cervical mobility    Insurance/Certification information:  PT Insurance Information: [facility]- no auth, 40$ copay  Physician Information:  Referring Practitioner: Dr. Yee Sumner of care signed (Y/N):     Date of Patient follow up with Physician:      Progress Report: []  Yes  [x]  No     Date Range for reporting period:  Beginnin2021  Ending:     Progress report due (10 Rx/or 30 days whichever is less):      Recertification due (POC duration/ or 90 days whichever is less): 21     Visit # Insurance/POC Allowable Auth Needed   1 12 []Yes    [x]No     Functional Outcomes Measure:    Date Assessed: at eval  Test:NDI  Score:5    Pain level:  2/10     SUBJECTIVE:    Patient stated complaint:Pt states he has chronically had R shoulder issues for 5 years. States for the 6 months he has had numbness and itching going down his R arm and isn't going away. Also having R shoulder pain when he reaches across his body or behind him. Pain is 2/10 and 0/10 at rest. Numbness comes and goes, itching is constant. Goes all the way down to his wrist on the R arm. If he extends his head and looks to the R then he gets the numbness down his R arm.        OBJECTIVE:    Observation:    Test measurements:      Assessment:  *cervical radiculopathy on Right. Numbness and Itching improved with traction.  Also has chronic R shoulder issue    Plan:   Manual and Traction if continues to centralize symptoms  Posture and Education  Strengthening/Endurance  Treatment of R shoulder      RESTRICTIONS/PRECAUTIONS: nerve compression in R lower cervical- positive spurlings    Exercises/Interventions:   Therapeutic Ex (88918)  Min: Resistance/Repetitions Notes                                           Manual Intervention (10065)  Min:     Cerv mobs/manip Cervical Sideglides    Stretching UT STretch    Traction Moderate with L sidebend Relieved numbness and itching   Rib mobilizations     STM Cervical Paraspinals         NMR re-education (57457)  Min:               Therapeutic Activity (85790)  Min:               Modalities  Min:           HEP     UT Stretch                      Other Therapeutic Activities:  Pt was educated on PT POC, Diagnosis, Prognosis, pathomechanics as well as frequency and duration of scheduling future physical therapy appointments. Time was also taken on this day to answer all patient questions and participation in PT. Reviewed appointment policy in detail with patient and patient verbalized understanding. Home Exercise Program:  Instructed patient on an HEP. Patient demonstrated exercises correctly. Handout with pictures and # of reps/sets was given. Exercises are listed above. Therapeutic Exercise and NMR EXR  [x] (50051) Provided verbal/tactile cueing for activities related to strengthening, flexibility, endurance, ROM  for improvements in cervical, postural, scapular, scapulothoracic and UE control with self care, reaching, carrying, lifting, house/yardwork, driving/computer work.    [] (70561) Provided verbal/tactile cueing for activities related to improving balance, coordination, kinesthetic sense, posture, motor skill, proprioception  to assist with cervical, scapular, scapulothoracic and UE control with self care, reaching, carrying, lifting, house/yardwork, driving/computer work.     Therapeutic Activities:    [x] (61489 or 07038) Provided verbal/tactile cueing for activities related to improving balance, coordination, kinesthetic sense, posture, motor skill, proprioception and motor activation to allow for proper function of cervical, scapular, scapulothoracic and UE control with self care, carrying, lifting, driving/computer work.      Home Exercise Program:    [x] (46697) Reviewed/Progressed HEP activities related to strengthening, flexibility, endurance, ROM of cervical, scapular, scapulothoracic and UE control with self care, reaching, carrying, lifting, house/yardwork, driving/computer work  [] (81727) Reviewed/Progressed HEP activities related to improving balance, coordination, kinesthetic sense, posture, motor skill, proprioception of cervical, scapular, scapulothoracic and UE control with self care, reaching, carrying, lifting, house/yardwork, driving/computer work      Manual Treatments:  PROM / STM / Oscillations-Mobs:  G-I, II, III, IV (PA's, Inf., Post.)  [x] (19519) Provided manual therapy to mobilize soft tissue/joints of cervical/CT, scapular GHJ and UE for the purpose of decreasing headache, modulating pain, promoting relaxation,  increasing ROM, reducing/eliminating soft tissue swelling/inflammation/restriction, improving soft tissue extensibility and allowing for proper ROM for normal function with self care, reaching, carrying, lifting, house/yardwork, driving/computer work    If [facility] Please Indicate Time In/Out  CPT Code Time in Time out                                   Approval Dates:  CPT Code Units Approved Units Used  Date Updated:                     Charges:  Timed Code Treatment Minutes: 21   Total Treatment Minutes: 50     [x] EVAL (LOW) 52159 (typically 20 minutes face-to-face)  [] EVAL (MOD) 78745 (typically 30 minutes face-to-face)  [] EVAL (HIGH) 14949 (typically 45 minutes face-to-face)  [] RE-EVAL     [] FA(61594) x     [] Dry needle 1 or 2 Muscles (18446)  [] NMR (87528) x     [] Dry needle 3+ Muscles (40680)  [x] Manual (14249) x     [] Ultrasound (03625) x  [] TA (69544) x     [x] Mech Traction (93290)  [] ES(attended) (61565)     [] ES (un) (34453):   [] Vasopump (11611) [] Ionto (47206)   [] Other:    GOALS:Therapist goals for Patient:   Short Term Goals: To be achieved in: 2 weeks  1. Independent in HEP and progression per patient tolerance, in order to prevent re-injury. []? Progressing: []? Met: []? Not Met: []? Adjusted  2. Patient will have a decrease in pain to facilitate improvement in movement, function, and ADLs as indicated by Functional Deficits. []? Progressing: []? Met: []? Not Met: []? Adjusted     Long Term Goals: To be achieved in: 6 weeks  1. Disability index score of 0 or less for the NDI to assist with reaching prior level of function. []? Progressing: []? Met: []? Not Met: []? Adjusted  2. Patient will demonstrate increased AROM to [facility] of cervical/thoracic spine to allow for proper joint functioning as indicated by patients Functional Deficits. []? Progressing: []? Met: []? Not Met: []? Adjusted  3. Patient will demonstrate an increase in postural awareness and control and activation of  Deep cervical stabilizers to allow for proper functional mobility as indicated by patients Functional Deficits. []? Progressing: []? Met: []? Not Met: []? Adjusted  4. Patient will return to normal daily functional activities without increased symptoms or restriction. []? Progressing: []? Met: []? Not Met: []? Adjusted  5. Pt will have normal motion in neck and report no numbness or itching in R UE. (patient specific functional goal)    []? Progressing: []? Met: []? Not Met: []?  Adjusted              (cut and paste from eval)    ASSESSMENT:  See eval    Treatment/Activity Tolerance:  [x] Patient tolerated treatment well [] Patient limited by fatique  [] Patient limited by pain  [] Patient limited by other medical complications  [] Other:     Overall Progression Towards Functional goals/ Treatment Progress Update:  [] Patient is progressing as expected towards functional goals listed. [] Progression is slowed due to complexities/Impairments listed. [] Progression has been slowed due to co-morbidities. [x] Plan just implemented, too soon to assess goals progression <30days   [] Goals require adjustment due to lack of progress  [] Patient is not progressing as expected and requires additional follow up with physician  [] Other    Prognosis for POC: [x] Good [] Fair  [] Poor    Patient requires continued skilled intervention: [x] Yes  [] No        PLAN: See eval  [] Continue per plan of care [] Alter current plan (see comments)  [x] Plan of care initiated [] Hold pending MD visit [] Discharge    Electronically signed by: Malachi Foster, PT    Malachi Foster PT, DPT, OMT-C    Note: If patient does not return for scheduled/recommended follow up visits, this note will serve as a discharge from care along with the most recent update on progress.

## 2021-11-23 NOTE — PROGRESS NOTES
[facility]  Outpatient Rehabilitation & Therapy  [address] [place] [address]  Phone: [phone]   Fax:     [phone]          Physical Therapy Certification    Dear Referring Practitioner: Dr. Mariajose Grove,    We had the pleasure of evaluating the following patient for physical therapy services at [facility]. A summary of our findings can be found in the initial assessment below. This includes our plan of care. If you have any questions or concerns regarding these findings, please do not hesitate to contact me at the office phone number checked above. Thank you for the referral.       Physician Signature:_______________________________Date:__________________  By signing above (or electronic signature), therapists plan is approved by physician      Patient: Arun Khan   : 1965   MRN: [id number]  Referring Physician: Referring Practitioner: Dr. Mariajose Grove      Evaluation Date: 2021      Medical Diagnosis Information:  Diagnosis: M54.12 (ICD-10-CM) - Right cervical radiculopathy   Treatment Diagnosis: Decreased sensation in R UE. Radiating pain, limited cervical mobility                                         Insurance information: PT Insurance Information: [facility]- no auth, 40$ copay    Precautions/ Contra-indications:   Latex Allergy:  [x]NO      []YES  Preferred Language for Healthcare:   [x]English       []other:    C-SSRS Triggered by Intake questionnaire (Past 2 wk assessment ):   [x] No, Questionnaire did not trigger screening.   [] Yes, Patient intake triggered C-SSRS Screening      [] C-SSRS Screening completed  [] PCP notified via Epic     SUBJECTIVE: Patient stated complaint:Pt states he has chronically had R shoulder issues for 5 years. States for the 6 months he has had numbness and itching going down his R arm and isn't going away.  Also having R shoulder pain when he reaches across his body or behind him. Pain is 2/10 and 0/10 at rest. Numbness comes and goes, itching is constant. Goes all the way down to his wrist on the R arm. If he extends his head and looks to the R then he gets the numbness down his R arm. Relevant Medical History:Additional Pertinent Hx: CAD, Hyperlipidemia  Functional Outcomes Measure: NDI= 5    Pain Scale: 2/10  Easing factors: rest  Provocative factors: Extending head and looking to the R     Type: []Constant   [x]Intermittent  []Radiating []Localized []other:     Numbness/Tingling: In R UE intermittently and when he extends and rotates head to the right. Itching feeling in R UE down to wrist    Occupation/School:-desk work     Living Status/Prior Level of Function: Independent with ADLs and IADLs,     OBJECTIVE:   Posture: Fair    Functional Mobility/Transfers: WNL    Palpation: WNL    Bandages/Dressings/Incisions: NA    Gait: (include devices/WB status):  WNL     CERV ROM     Cervical Flexion WNL    Cervical Extension Limited 50% Extension with R rotation causes radiating symptoms    Left Right   Cervical SB WNL WNL   Cervical rotation WNL WNL   Quadrant     Dorsal Glide      UE ROM Left Right   Shoulder Flex UE Grossly WNL UE Grossly WNL besides IR limited 25%   Shoulder Abd     Shoulder ER     Shoulder IR     Elbow flex/ext     Wrist flex/ext/pro/sup     Finger flex/ext/opposition     Shoulder AROM WNL w OP     UE Strength  Left Right   Shoulder Flex UE Grossly WNL UE Grossly WNL   Shoulder Scap     Shoulder ABd (C5 Axillary)     Shoulder ER      Shoulder IR     Elbow Flex (C5 Musc)     Elbow Ext (C7 Radial)     Wrist Flex (C6 Radial)     Wrist Ext (C7 Radial)     Finger flex (C8 median)     Finger ext (C7 Radial-PIN)     APB (T1 Median)     Finger Abd (T1 Ulnar)     UE myotomes WNL        Reflexes Normal Abnormal Comments   *no UE reflexes noted bilaterally besides small one on left brachiradialis            S1-2 Seated achilles [] []    S1-2 Prone []Congenital spine pathologies   []Prior surgical intervention  []Osteoporosis (M81.8)  []Osteopenia (M85.8)   Endocrine conditions   []Hypothyroid (E03.9)  []Hyperthyroid Gastrointestinal conditions   []Constipation (D41.97)   Metabolic conditions   []Morbid obesity (E66.01)  []Diabetes type 1(E10.65) or 2 (E11.65)   []Neuropathy (G60.9)     Pulmonary conditions   []Asthma (J45)  []Coughing   []COPD (J44.9)   Psychological Disorders  []Anxiety (F41.9)  []Depression (F32.9)   []Other:   []Other:          Barriers to/and or personal factors that will affect rehab potential:              []Age  []Sex   []Smoker              []Motivation/Lack of Motivation                        []Co-Morbidities              []Cognitive Function, education/learning barriers              [x]Environmental, home barriers              [x]profession/work barriers  []past PT/medical experience  []other:  Justification: rides motorcycle and bike. Computer work    Falls Risk Assessment (30 days):   [x] Falls Risk assessed and no intervention required.   [] Falls Risk assessed and Patient requires intervention due to being higher risk   TUG score (>12s at risk):     [] Falls education provided, including     ASSESSMENT:    Functional Impairments:     [x]Noted cervical/thoracic/GHJ joint hypomobility   []Noted cervical/thoracic/GHJ joint hypermobility   []Decreased cervical/UE functional ROM   []Noted Headache pain aggravated by neck movements with/without dizziness   [x]Abnormal reflexes/sensation/myotomal/dermatomal deficits   []Decreased DCF control or ability to hold head up   []Decreased RC/scapular/core strength and neuromuscular control    []Decreased UE functional strength   []other:      Functional Activity Limitations (from functional questionnaire and intake)   [x]Reduced ability to tolerate prolonged functional positions   [x]Reduced ability or difficulty with changes of positions or transfers between positions   []Reduced ability to maintain good posture and demonstrate good body mechanics with sitting, bending, and lifting   [] Reduced ability or tolerance with driving and/or computer work   []Reduced ability to perform lifting, reaching, carrying tasks   []Reduced ability to concentrate   [x]Reduced ability to sleep    []Reduced ability to tolerate any impact through UE or spine   []Reduced ability to ambulate prolonged functional periods/distances   []other:    Participation Restrictions   [x]Reduced participation in self care activities   [x]Reduced participation in home management activities   [x]Reduced participation in work activities   [x]Reduced participation in social activities. [x]Reduced participation in sport/recreational activities. Classification/Subgrouping:   []signs/symptoms consistent with neck pain with mobility deficits     []signs/symptoms consistent with neck pain with movement coordinated impairments    [x]signs/symptoms consistent with neck pain with radiating pain    []signs/symptoms consistent with neck pain with headaches (cervicogenic)    [x]Signs/symptoms consistent with nerve root involvement including myotome & dermatome dysfunction   []sign/symptoms consistent with facet dysfunction of cervical and thoracic spine    []signs/symptoms consistent suggesting central cord compression/UMN syndromes   []signs/symptoms consistent with discogenic cervical pain   []signs/symptoms consistent with rib dysfunction   []signs/symptoms consistent with postural dysfunction   []signs/symptoms consistent with shoulder pathology    []signs/symptoms consistent with post-surgical status including decreased ROM, strength and function.    []signs/symptoms consistent with pathology which may benefit from Dry Needling   []signs/symptoms which may limit the use of advanced manual therapy techniques: (Elevated CV risk profile, recent trauma, intolerance to end range positions, prior TIA, visual issues, UE neurological compromise ) Prognosis/Rehab Potential:      []Excellent   [x]Good    []Fair   []Poor    Tolerance of evaluation/treatment:    []Excellent   [x]Good    []Fair   []Poor    Physical Therapy Evaluation Complexity Justification  [x] A history of present problem with:  [x] no personal factors and/or comorbidities that impact the plan of care;  []1-2 personal factors and/or comorbidities that impact the plan of care  []3 personal factors and/or comorbidities that impact the plan of care  [x] An examination of body systems using standardized tests and measures addressing any of the following: body structures and functions (impairments), activity limitations, and/or participation restrictions;:  [x] a total of 1-2 or more elements   [] a total of 3 or more elements   [] a total of 4 or more elements   [x] A clinical presentation with:  [x] stable and/or uncomplicated characteristics   [] evolving clinical presentation with changing characteristics  [] unstable and unpredictable characteristics;   [x] Clinical decision making of [] low, [] moderate, [] high complexity using standardized patient assessment instrument and/or measurable assessment of functional outcome. [x] EVAL (LOW) 36326 (typically 20 minutes face-to-face)  [] EVAL (MOD) 29682 (typically 30 minutes face-to-face)  [] EVAL (HIGH) 06845 (typically 45 minutes face-to-face)  [] RE-EVAL     PLAN:   Frequency/Duration:  2 days per week for 6 Weeks:  Interventions:  [x]  Therapeutic exercise including: strength training, ROM, for cervical spine,scapula, core and Upper extremity, including postural re-education. [x]  NMR activation and proprioception for Deep cervical flexors, periscapular and RC muscles and Core, including postural re-education. [x]  Manual therapy as indicated for C/T spine, ribs, Soft tissue to include: Dry Needling/IASTM, STM, PROM, Gr I-IV mobilizations, manipulation.    [x] Modalities as needed that may include: thermal agents, E-stim,

## 2021-11-30 ENCOUNTER — APPOINTMENT (OUTPATIENT)
Dept: PHYSICAL THERAPY | Age: 56
End: 2021-11-30
Payer: COMMERCIAL

## 2021-11-30 ENCOUNTER — OFFICE VISIT (OUTPATIENT)
Dept: ORTHOPEDIC SURGERY | Age: 56
End: 2021-11-30
Payer: COMMERCIAL

## 2021-11-30 VITALS — WEIGHT: 200 LBS | BODY MASS INDEX: 27.09 KG/M2 | RESPIRATION RATE: 16 BRPM | HEIGHT: 72 IN

## 2021-11-30 DIAGNOSIS — M25.511 RIGHT SHOULDER PAIN, UNSPECIFIED CHRONICITY: ICD-10-CM

## 2021-11-30 DIAGNOSIS — M75.21 TENDONITIS OF LONG HEAD OF BICEPS BRACHII OF RIGHT SHOULDER: Primary | ICD-10-CM

## 2021-11-30 PROCEDURE — G8419 CALC BMI OUT NRM PARAM NOF/U: HCPCS | Performed by: ORTHOPAEDIC SURGERY

## 2021-11-30 PROCEDURE — G8482 FLU IMMUNIZE ORDER/ADMIN: HCPCS | Performed by: ORTHOPAEDIC SURGERY

## 2021-11-30 PROCEDURE — G8427 DOCREV CUR MEDS BY ELIG CLIN: HCPCS | Performed by: ORTHOPAEDIC SURGERY

## 2021-11-30 PROCEDURE — 99213 OFFICE O/P EST LOW 20 MIN: CPT | Performed by: ORTHOPAEDIC SURGERY

## 2021-11-30 NOTE — PROGRESS NOTES
CHIEF COMPLAINT: Right shoulder pain    History:    Alexandru Salvador is a 55 y.o. right handed male referred by Allison Mckeon MD for Sports Medicine consultation for evaluation and treatment of Right shoulder pain. This is evaluated as a personal injury. The pain began 6 months ago. Pain is rated as a 4/10. There was not an injury. He points to pain being located along the lateral aspect of the shoulder. Pain is aggravated with reaching across his body and behind his back. The patient has not had PT. The patient has not had an injection. The patient has not tried NSAIDs. The patient has not tried ice. He states he was raking leaves yesterday, and that did actually improve his symptoms. Patient's occupation is . He does note some numbness in his arm. He is scheduled for EMG next week. Outside reports reviewed:  none. Past Medical History:   Diagnosis Date    CAD (coronary artery disease) 4/10    ptca LAD Pelberg stent    Hyperlipidemia        Past Surgical History:   Procedure Laterality Date    APPENDECTOMY         Current Outpatient Medications on File Prior to Visit   Medication Sig Dispense Refill    tadalafil (CIALIS) 20 MG tablet Take 1 tablet by mouth daily as needed for Erectile Dysfunction 10 tablet 1    ezetimibe (ZETIA) 10 MG tablet TAKE 1 TABLET BY MOUTH  DAILY 90 tablet 3    rosuvastatin (CRESTOR) 20 MG tablet TAKE 1 TABLET BY MOUTH  DAILY 90 tablet 3    vitamin D (ERGOCALCIFEROL) 87011 units CAPS capsule Take 1 capsule by mouth once a week for 8 doses 4 capsule 1    omeprazole (PRILOSEC) 20 MG delayed release capsule TK 1 C PO D  11    aspirin 81 MG EC tablet Take 81 mg by mouth daily.  Cetirizine HCl (ZYRTEC PO) Take  by mouth. No current facility-administered medications on file prior to visit.        No Known Allergies    Social History     Socioeconomic History    Marital status:      Spouse name: Not on file    Number of children: Not on file    Years of education: Not on file    Highest education level: Not on file   Occupational History    Not on file   Tobacco Use    Smoking status: Never Smoker    Smokeless tobacco: Never Used   Substance and Sexual Activity    Alcohol use: Yes     Alcohol/week: 4.2 standard drinks     Types: 5 Standard drinks or equivalent per week    Drug use: No    Sexual activity: Yes     Partners: Female   Other Topics Concern    Not on file   Social History Narrative    Not on file     Social Determinants of Health     Financial Resource Strain:     Difficulty of Paying Living Expenses: Not on file   Food Insecurity:     Worried About Running Out of Food in the Last Year: Not on file    Frances of Food in the Last Year: Not on file   Transportation Needs:     Lack of Transportation (Medical): Not on file    Lack of Transportation (Non-Medical):  Not on file   Physical Activity:     Days of Exercise per Week: Not on file    Minutes of Exercise per Session: Not on file   Stress:     Feeling of Stress : Not on file   Social Connections:     Frequency of Communication with Friends and Family: Not on file    Frequency of Social Gatherings with Friends and Family: Not on file    Attends Jainism Services: Not on file    Active Member of [address] [place] [address] [employer] or Organizations: Not on file    Attends Club or Organization Meetings: Not on file    Marital Status: Not on file   Intimate Partner Violence:     Fear of Current or Ex-Partner: Not on file    Emotionally Abused: Not on file    Physically Abused: Not on file    Sexually Abused: Not on file   Housing Stability:     Unable to Pay for Housing in the Last Year: Not on file    Number of [place] in the Last Year: Not on file    Unstable Housing in the Last Year: Not on file       Family History   Problem Relation Age of Onset    Cancer Mother         skin    High Cholesterol Mother     Other Sister         spinal stenosis    Coronary Art Dis Father Review of Systems:   I have reviewed the clinically relevant past medical history, medications, allergies, family history, social history, and 13 point Review of Systems from the patient's recent history form & documented any details relevant to today's presenting complaints in the history above. The patient's self-reported past medical history, medications, allergies, family history, social history, and Review of Systems form from 11/23/21 have been scanned into the chart under the \"Media\" tab. Physical Examination:      Vital signs:  Resp 16   Ht 6' (1.829 m)   Wt 200 lb (90.7 kg)   BMI 27.12 kg/m²     General:   alert, appears stated age, cooperative and no distress   Right Shoulder   Active ROM:   forward flexion 180, external rotation 80, internal rotation L4. Bilateral shoulders   Joint Tenderness:   biceps tendon   Neer:   negative   Vee:   Mildly positive   Strength:   5/5 Supraspinatus, External rotation    Bilateral shoulders   Drop-arm test:   negative   Belly-press test:   negative   Bear-hug test:   negative   Speed's test:   negative   Bicipital groove tenderness:  markedly positive   Rangel's test:   negative   Cross-body adduction test:   negative    AC joint tenderness:   negative     There are no skin lesions, cellulitis, or extreme edema in the upper extremities. Sensation is grossly intact to light touch bilaterally upper extremity. The patient has warm and well-perfused Bilateral upper extremities with brisk capillary refill. Imaging   Right Shoulder X-Ray: 3 view x-rays of the shoulder including AP, scapular Y, and axillary obtained and reviewed  AC Joint: narrowing moderate. Os acromiale. Glenohumeral joint: no abnormalities noted  Elevation humeral head: absent        Assessment:      Right shoulder long head biceps tendinitis      Plan:      Natural history and expected course discussed. Questions answered. Ice as needed. NSAIDs as needed.     PT referral provided. Follow-up in 2 months. Call IF NO improvement with PT after 4-6 weeks and I will order MRI prior to patient being seen back in office. Abner Cuello. Nellie Garcia MD  Orthopaedic Surgery and Sports Medicine     Disclaimer: This note was generated with use of a verbal recognition program and an attempt was made to check for errors. It is possible that there are still dictated errors within this office note. If so, please bring any significant errors to my attention for an addendum. All efforts were made to ensure that this office note is accurate.

## 2021-12-02 ENCOUNTER — HOSPITAL ENCOUNTER (OUTPATIENT)
Dept: PHYSICAL THERAPY | Age: 56
Setting detail: THERAPIES SERIES
Discharge: HOME | End: 2021-12-02
Payer: COMMERCIAL

## 2021-12-02 PROCEDURE — 97140 MANUAL THERAPY 1/> REGIONS: CPT

## 2021-12-02 PROCEDURE — 97012 MECHANICAL TRACTION THERAPY: CPT

## 2021-12-02 NOTE — FLOWSHEET NOTE
[address]  Phone: [phone]   Fax:     [phone]    Physical Therapy Treatment Note/ Progress Report:     Date:  2021    Patient Name:  Yamileth Quiñones    :  1965  MRN: [id number]    Pertinent Medical History:  CAD, Hyperlipidemia    Medical/Treatment Diagnosis Information:  Diagnosis: M54.12 (ICD-10-CM) - Right cervical radiculopathy  Treatment Diagnosis: Decreased sensation in R UE. Radiating pain, limited cervical mobility    Insurance/Certification information:  PT Insurance Information: [facility]- no auth, 40$ copay  Physician Information:  Referring Practitioner: Dr. Delvis Atkins of care signed (Y/N):     Date of Patient follow up with Physician:      Progress Report: []  Yes  [x]  No     Date Range for reporting period:  Beginnin2021  Ending:     Progress report due (10 Rx/or 30 days whichever is less):      Recertification due (POC duration/ or 90 days whichever is less): 21     Visit # Insurance/POC Allowable Auth Needed   1 12 []Yes    [x]No     Functional Outcomes Measure:    Date Assessed: at eval  Test:NDI  Score:5    Pain level:  2/10     SUBJECTIVE:    Patient stated complaint:Pt states he has chronically had R shoulder issues for 5 years. States for the 6 months he has had numbness and itching going down his R arm and isn't going away. Also having R shoulder pain when he reaches across his body or behind him. Pain is 2/10 and 0/10 at rest. Numbness comes and goes, itching is constant. Goes all the way down to his wrist on the R arm. If he extends his head and looks to the R then he gets the numbness down his R arm.   : Patient reports frequency of numbness in his right arm is decreasing. States still getting some stiffness in his neck and R shoulder pain when reaching across body.       OBJECTIVE:    Observation:    Test (typically 30 minutes face-to-face)  [] EVAL (HIGH) 58621 (typically 45 minutes face-to-face)  [] RE-EVAL     [] QR(18482) x     [] Dry needle 1 or 2 Muscles (38085)  [] NMR (86263) x     [] Dry needle 3+ Muscles (74999)  [x] Manual (13783) x 2    [] Ultrasound (65183) x  [] TA (21453) x     [x] Mech Traction (09408)  [] ES(attended) (34988)     [] ES (un) (19297):   [] Vasopump (69955) [] Ionto (97864)   [] Other:    GOALS:Therapist goals for Patient:   Short Term Goals: To be achieved in: 2 weeks  1. Independent in HEP and progression per patient tolerance, in order to prevent re-injury. []? Progressing: []? Met: []? Not Met: []? Adjusted  2. Patient will have a decrease in pain to facilitate improvement in movement, function, and ADLs as indicated by Functional Deficits. []? Progressing: []? Met: []? Not Met: []? Adjusted     Long Term Goals: To be achieved in: 6 weeks  1. Disability index score of 0 or less for the NDI to assist with reaching prior level of function. []? Progressing: []? Met: []? Not Met: []? Adjusted  2. Patient will demonstrate increased AROM to [address] of cervical/thoracic spine to allow for proper joint functioning as indicated by patients Functional Deficits. []? Progressing: []? Met: []? Not Met: []? Adjusted  3. Patient will demonstrate an increase in postural awareness and control and activation of  Deep cervical stabilizers to allow for proper functional mobility as indicated by patients Functional Deficits. []? Progressing: []? Met: []? Not Met: []? Adjusted  4. Patient will return to normal daily functional activities without increased symptoms or restriction. []? Progressing: []? Met: []? Not Met: []? Adjusted  5. Pt will have normal motion in neck and report no numbness or itching in R UE. (patient specific functional goal)    []? Progressing: []? Met: []? Not Met: []?  Adjusted              (cut and paste from eval)    ASSESSMENT:  See eval    Treatment/Activity Tolerance:  [x] Patient tolerated treatment well [] Patient limited by fatique  [] Patient limited by pain  [] Patient limited by other medical complications  [] Other:     Overall Progression Towards Functional goals/ Treatment Progress Update:  [] Patient is progressing as expected towards functional goals listed. [] Progression is slowed due to complexities/Impairments listed. [] Progression has been slowed due to co-morbidities. [x] Plan just implemented, too soon to assess goals progression <30days   [] Goals require adjustment due to lack of progress  [] Patient is not progressing as expected and requires additional follow up with physician  [] Other    Prognosis for POC: [x] Good [] Fair  [] Poor    Patient requires continued skilled intervention: [x] Yes  [] No        PLAN: See eval  [] Continue per plan of care [] Alter current plan (see comments)  [x] Plan of care initiated [] Hold pending MD visit [] Discharge    Electronically signed by: Dilip Talbot, PTA [id number]   Robel Jolley PT, DPT, OMT-C    Note: If patient does not return for scheduled/recommended follow up visits, this note will serve as a discharge from care along with the most recent update on progress.

## 2021-12-13 ENCOUNTER — OFFICE VISIT (OUTPATIENT)
Dept: PRIMARY CARE CLINIC | Age: 56
End: 2021-12-13

## 2021-12-13 VITALS
RESPIRATION RATE: 15 BRPM | HEIGHT: 72 IN | WEIGHT: 208.6 LBS | OXYGEN SATURATION: 98 % | TEMPERATURE: 97.2 F | HEART RATE: 65 BPM | DIASTOLIC BLOOD PRESSURE: 67 MMHG | SYSTOLIC BLOOD PRESSURE: 125 MMHG | BODY MASS INDEX: 28.25 KG/M2

## 2021-12-13 DIAGNOSIS — I25.10 CORONARY ARTERY DISEASE WITHOUT ANGINA PECTORIS, UNSPECIFIED VESSEL OR LESION TYPE, UNSPECIFIED WHETHER NATIVE OR TRANSPLANTED HEART: Primary | ICD-10-CM

## 2021-12-13 PROCEDURE — 99999 PR OFFICE/OUTPT VISIT,PROCEDURE ONLY: CPT | Performed by: INTERNAL MEDICINE

## 2021-12-13 RX ORDER — OMEPRAZOLE 40 MG/1
CAPSULE, DELAYED RELEASE ORAL DAILY
COMMUNITY
Start: 2021-11-16 | End: 2022-01-31

## 2021-12-13 SDOH — ECONOMIC STABILITY: FOOD INSECURITY: WITHIN THE PAST 12 MONTHS, THE FOOD YOU BOUGHT JUST DIDN'T LAST AND YOU DIDN'T HAVE MONEY TO GET MORE.: NEVER TRUE

## 2021-12-13 SDOH — ECONOMIC STABILITY: FOOD INSECURITY: WITHIN THE PAST 12 MONTHS, YOU WORRIED THAT YOUR FOOD WOULD RUN OUT BEFORE YOU GOT MONEY TO BUY MORE.: NEVER TRUE

## 2021-12-13 ASSESSMENT — SOCIAL DETERMINANTS OF HEALTH (SDOH): HOW HARD IS IT FOR YOU TO PAY FOR THE VERY BASICS LIKE FOOD, HOUSING, MEDICAL CARE, AND HEATING?: NOT HARD AT ALL

## 2021-12-13 NOTE — PROGRESS NOTES
2021    Delfino Angel (:  1965) is a 55 y.o. male, here for evaluation of the following medical concerns:    No chief complaint on file. HPI  81-year-old white male last seen by his former PCP in 2019, with history of coronary artery disease GERD c/b Barretts followed in GI, BPH followed in urology rhinitis D deficiency who comes in to establish care following his providers relocation. Have not seen his cardiologist at the [facility] Dr. Floyd Lama since 2019. pCircumflex PCI GLORIA . Negative stress echo 2018, achieving nearly 13 METS. Cardiologist provided prescription for Cialis in 2019 for new onset ED. Review of Systems   Constitutional: Negative for activity change, appetite change, fatigue and unexpected weight change. HENT: Negative for dental problem, sinus pain, sore throat and trouble swallowing. Eyes: Negative for pain and visual disturbance. Respiratory: Negative for apnea, cough, chest tightness, shortness of breath and wheezing. Cardiovascular: Negative for chest pain and palpitations. Gastrointestinal: Negative for abdominal pain, blood in stool, constipation, diarrhea, nausea, rectal pain and vomiting. Endocrine: Negative for cold intolerance, heat intolerance, polydipsia, polyphagia and polyuria. Genitourinary: Negative for difficulty urinating, dysuria, flank pain, frequency, hematuria, pelvic pain, urgency, vaginal bleeding and vaginal discharge. Musculoskeletal: Negative for arthralgias, back pain, gait problem, joint swelling, myalgias, neck pain and neck stiffness. Skin: Negative for color change and rash. Neurological: Negative for dizziness, tremors, syncope, speech difficulty, weakness, light-headedness and headaches. Hematological: Negative for adenopathy. Does not bruise/bleed easily. Psychiatric/Behavioral: Negative for agitation, behavioral problems, decreased concentration, sleep disturbance and suicidal ideas.  The patient is not nervous/anxious and is not hyperactive. Prior to Visit Medications    Medication Sig Taking? Authorizing Provider   tadalafil (CIALIS) 20 MG tablet Take 1 tablet by mouth daily as needed for Erectile Dysfunction  Unknown Nation, MD   ezetimibe (ZETIA) 10 MG tablet TAKE 1 TABLET BY MOUTH  DAILY  Júnior Fernandez, DO   rosuvastatin (CRESTOR) 20 MG tablet TAKE 1 TABLET BY MOUTH  DAILY  Júnior Fernandez, DO   vitamin D (ERGOCALCIFEROL) 51783 units CAPS capsule Take 1 capsule by mouth once a week for 8 doses  Júnior Fernandez, DO   omeprazole (PRILOSEC) 20 MG delayed release capsule TK 1 C PO D  Historical Provider, MD   aspirin 81 MG EC tablet Take 81 mg by mouth daily. Historical Provider, MD   Cetirizine HCl (ZYRTEC PO) Take  by mouth. Historical Provider, MD        No Known Allergies    Past Medical History:   Diagnosis Date    CAD (coronary artery disease) 4/10    ptca LAD Pelberg stent    Hyperlipidemia        Past Surgical History:   Procedure Laterality Date    APPENDECTOMY         Social History     Socioeconomic History    Marital status:      Spouse name: Not on file    Number of children: Not on file    Years of education: Not on file    Highest education level: Not on file   Occupational History    Not on file   Tobacco Use    Smoking status: Never Smoker    Smokeless tobacco: Never Used   Substance and Sexual Activity    Alcohol use:  Yes     Alcohol/week: 4.2 standard drinks     Types: 5 Standard drinks or equivalent per week    Drug use: No    Sexual activity: Yes     Partners: Female   Other Topics Concern    Not on file   Social History Narrative    Not on file     Social Determinants of Health     Financial Resource Strain:     Difficulty of Paying Living Expenses: Not on file   Food Insecurity:     Worried About Running Out of Food in the Last Year: Not on file    Frances of Food in the Last Year: Not on file   Transportation Needs:     Lack of Transportation (Medical): Not on file    Lack of Transportation (Non-Medical): Not on file   Physical Activity:     Days of Exercise per Week: Not on file    Minutes of Exercise per Session: Not on file   Stress:     Feeling of Stress : Not on file   Social Connections:     Frequency of Communication with Friends and Family: Not on file    Frequency of Social Gatherings with Friends and Family: Not on file    Attends Orthodox Services: Not on file    Active Member of [address] [place] [address] [employer] or Organizations: Not on file    Attends Club or Organization Meetings: Not on file    Marital Status: Not on file   Intimate Partner Violence:     Fear of Current or Ex-Partner: Not on file    Emotionally Abused: Not on file    Physically Abused: Not on file    Sexually Abused: Not on file   Housing Stability:     Unable to Pay for Housing in the Last Year: Not on file    Number of [place] in the Last Year: Not on file    Unstable Housing in the Last Year: Not on file        Family History   Problem Relation Age of Onset    Cancer Mother         skin    High Cholesterol Mother     Other Sister         spinal stenosis    Coronary Art Dis Father        There were no vitals filed for this visit. Estimated body mass index is 27.12 kg/m² as calculated from the following:    Height as of 11/30/21: 6' (1.829 m). Weight as of 11/30/21: 200 lb (90.7 kg). PHYSICAL EXAM  GENERAL:  Pleasant  male who looks his stated age, awake alert and oriented x3, no acute distress. NMSK: Muscle deficit, altered sensation dorsum of the right forearm and right shoulder Consistent with C6-C7 deficit. Pulses symmetrically intact. Negative Adson's maneuver. SKIN: Jarquin type II skin. Freckles. Back SK. Stork bite  PSYCH:  No psychomotor retardation or agitation. Good eye contact. Unrestricted affect range. Mood congruent with affect. Linear thought.     LABS  Lab Review   Orders Only on 11/11/2021   Component Date Value    Cholesterol, Total 11/11/2021 125     Triglycerides 11/11/2021 58     HDL 11/11/2021 46     LDL Calculated 11/11/2021 67     VLDL Cholesterol Calcula* 11/11/2021 12     AST 11/11/2021 27     ALT 11/11/2021 23     Sodium 11/11/2021 139     Potassium 11/11/2021 4.3     Chloride 11/11/2021 101     CO2 11/11/2021 26     Anion Gap 11/11/2021 12     Glucose 11/11/2021 102*    BUN 11/11/2021 16     CREATININE 11/11/2021 0.8*    GFR Non- 11/11/2021 >60     GFR  11/11/2021 >60     Calcium 11/11/2021 9.6     TSH 11/11/2021 3.23     Vit D, 25-Hydroxy 11/11/2021 44.0     Vitamin B-12 11/11/2021 682     Hepatitis C Virus Ab by * 11/11/2021 0.06     Hepatitis C Virus Ab by * 11/11/2021 Negative    Orders Only on 12/28/2020   Component Date Value    Magnesium 12/28/2020 2.30          ASSESSMENT/PLAN  1. Right cervical radiculopathy  C67 distribution, previously improved with PT before declines Neurontin though scratching at the skin a fair amount. Saw Ortho who proceeded with sickle therapy home traction unit home exercise program as well as PT for the shoulder. Plan for shoulder MRI if symptoms persist, EMG also ordered. 2. Coronary artery disease involving native coronary artery of native heart without angina pectoris  Asymptomatic currently. Reassuring BMP. 3. Pure hypercholesterolemia  On medium dose Crestor and Zetia, LDL well above 100 on last check, fortunately LDL now 67 with good triglycerides, typical male HDL, normal LFTs. 4. Routine adult health maintenance  Tdap and influenza due, provided today. Shingrix Covid up-to-date. Normal colonoscopy December 2012, repeat December 2022. Reassuring TSH B12 hepatitis C screen 2021.  - TSH with Reflex; Future  - Vitamin D 25 Hydroxy; Future  - Vitamin B12; Future  - Hep C AB RLFX HCV PCR-A; Future    5. Erectile dysfunction. Refilled Cialis.   Introduced the idea of taking daily low-dose, in setting of BPH may

## 2022-01-31 ENCOUNTER — OFFICE VISIT (OUTPATIENT)
Dept: PRIMARY CARE CLINIC | Age: 57
End: 2022-01-31
Payer: COMMERCIAL

## 2022-01-31 VITALS
OXYGEN SATURATION: 98 % | BODY MASS INDEX: 29.12 KG/M2 | DIASTOLIC BLOOD PRESSURE: 68 MMHG | SYSTOLIC BLOOD PRESSURE: 125 MMHG | HEIGHT: 72 IN | WEIGHT: 215 LBS | RESPIRATION RATE: 15 BRPM | HEART RATE: 65 BPM | TEMPERATURE: 96.8 F

## 2022-01-31 DIAGNOSIS — E78.00 PURE HYPERCHOLESTEROLEMIA: ICD-10-CM

## 2022-01-31 DIAGNOSIS — F80.81 STUTTER: ICD-10-CM

## 2022-01-31 DIAGNOSIS — N40.0 BENIGN PROSTATIC HYPERPLASIA, UNSPECIFIED WHETHER LOWER URINARY TRACT SYMPTOMS PRESENT: Primary | ICD-10-CM

## 2022-01-31 DIAGNOSIS — Z00.00 ROUTINE ADULT HEALTH MAINTENANCE: ICD-10-CM

## 2022-01-31 DIAGNOSIS — I25.10 CORONARY ARTERY DISEASE WITHOUT ANGINA PECTORIS, UNSPECIFIED VESSEL OR LESION TYPE, UNSPECIFIED WHETHER NATIVE OR TRANSPLANTED HEART: ICD-10-CM

## 2022-01-31 DIAGNOSIS — K22.70 BARRETT'S ESOPHAGUS WITHOUT DYSPLASIA: ICD-10-CM

## 2022-01-31 PROCEDURE — G8419 CALC BMI OUT NRM PARAM NOF/U: HCPCS | Performed by: INTERNAL MEDICINE

## 2022-01-31 PROCEDURE — 3017F COLORECTAL CA SCREEN DOC REV: CPT | Performed by: INTERNAL MEDICINE

## 2022-01-31 PROCEDURE — 1036F TOBACCO NON-USER: CPT | Performed by: INTERNAL MEDICINE

## 2022-01-31 PROCEDURE — 99213 OFFICE O/P EST LOW 20 MIN: CPT | Performed by: INTERNAL MEDICINE

## 2022-01-31 PROCEDURE — G8427 DOCREV CUR MEDS BY ELIG CLIN: HCPCS | Performed by: INTERNAL MEDICINE

## 2022-01-31 PROCEDURE — G8482 FLU IMMUNIZE ORDER/ADMIN: HCPCS | Performed by: INTERNAL MEDICINE

## 2022-01-31 RX ORDER — ROSUVASTATIN CALCIUM 40 MG/1
40 TABLET, COATED ORAL DAILY
Qty: 90 TABLET | Refills: 3 | Status: SHIPPED | OUTPATIENT
Start: 2022-01-31 | End: 2022-08-03 | Stop reason: SDUPTHER

## 2022-01-31 RX ORDER — MULTIVIT-MIN/IRON/FOLIC ACID/K 18-600-40
CAPSULE ORAL
COMMUNITY
End: 2022-08-03 | Stop reason: SDUPTHER

## 2022-01-31 RX ORDER — TADALAFIL 5 MG/1
5 TABLET ORAL DAILY
Qty: 90 TABLET | Refills: 1 | Status: SHIPPED | OUTPATIENT
Start: 2022-01-31 | End: 2022-06-07

## 2022-01-31 NOTE — PROGRESS NOTES
2022    Nida Zavala (:  1965) is a 55 y.o. male, here for evaluation of the following medical concerns:    Chief Complaint   Patient presents with    Hyperlipidemia    Other     stuttering        HPI  20-year-old white male last seen by his former PCP in 2019, with history of coronary artery disease GERD c/b Barretts followed in GI, BPH followed in urology rhinitis D deficiency who recently established care following his providers relocation, who comes in to review recent blood work. At last visit we asked him to check with his cardiologist regarding Crestor 40 mg, and to see if Cologuard was covered by insurance. We also organized PT Ortho for recurrent cervical radiculopathy. Last saw his cardiologist at the [facility] Dr. Zen Martinez since 2019. pCircumflex PCI GLORIA . Negative stress echo 2018, achieving nearly 13 METS. Cardiologist provided prescription for Cialis in 2019 for new onset ED. Review of Systems   Constitutional: Negative for activity change, appetite change, fatigue and unexpected weight change. HENT: Negative for dental problem, sinus pain, sore throat and trouble swallowing. Eyes: Negative for pain and visual disturbance. Respiratory: Negative for apnea, cough, chest tightness, shortness of breath and wheezing. Cardiovascular: Negative for chest pain and palpitations. Gastrointestinal: Negative for abdominal pain, blood in stool, constipation, diarrhea, nausea, rectal pain and vomiting. Endocrine: Negative for cold intolerance, heat intolerance, polydipsia, polyphagia and polyuria. Genitourinary: Negative for difficulty urinating, dysuria, flank pain, frequency, hematuria, pelvic pain, urgency, vaginal bleeding and vaginal discharge. Musculoskeletal: Negative for arthralgias, back pain, gait problem, joint swelling, myalgias, neck pain and neck stiffness. Skin: Negative for color change and rash.    Neurological: Negative for dizziness, tremors, syncope, speech difficulty, weakness, light-headedness and headaches. Hematological: Negative for adenopathy. Does not bruise/bleed easily. Psychiatric/Behavioral: Negative for agitation, behavioral problems, decreased concentration, sleep disturbance and suicidal ideas. The patient is not nervous/anxious and is not hyperactive. Prior to Visit Medications    Medication Sig Taking? Authorizing Provider   Cholecalciferol (VITAMIN D) 50 MCG (2000 UT) CAPS capsule Take by mouth Yes Historical Provider, MD   rosuvastatin (CRESTOR) 40 MG tablet Take 1 tablet by mouth daily Yes Mica Cifuentes MD   tadalafil (CIALIS) 5 MG tablet Take 1 tablet by mouth daily Yes Mica Cifuentes MD   esomeprazole (NEXIUM) 20 MG delayed release capsule Take 1 capsule by mouth every morning (before breakfast) Yes Mica Cifuentes MD   ezetimibe (ZETIA) 10 MG tablet TAKE 1 TABLET BY MOUTH  DAILY Yes Júnior Fernandez DO   aspirin 81 MG EC tablet Take 81 mg by mouth daily. Yes Historical Provider, MD   Cetirizine HCl (ZYRTEC PO) Take  by mouth. Yes Historical Provider, MD   vitamin D (ERGOCALCIFEROL) 90782 units CAPS capsule Take 1 capsule by mouth once a week for 8 doses  Jose Luis Bravo, DO        No Known Allergies    Past Medical History:   Diagnosis Date    CAD (coronary artery disease) 4/10    ptca LAD Pelberg stent    Hyperlipidemia        Past Surgical History:   Procedure Laterality Date    APPENDECTOMY         Social History     Socioeconomic History    Marital status:      Spouse name: Not on file    Number of children: Not on file    Years of education: Not on file    Highest education level: Not on file   Occupational History    Not on file   Tobacco Use    Smoking status: Never Smoker    Smokeless tobacco: Never Used   Substance and Sexual Activity    Alcohol use:  Yes     Alcohol/week: 4.2 standard drinks     Types: 5 Standard drinks or equivalent per week    Drug use: No    Sexual activity: Yes     Partners: Female   Other Topics Concern    Not on file   Social History Narrative    Not on file     Social Determinants of Health     Financial Resource Strain: Low Risk     Difficulty of Paying Living Expenses: Not hard at all   Food Insecurity: No Food Insecurity    Worried About Running Out of Food in the Last Year: Never true    [address] in the Last Year: Never true   Transportation Needs:     Lack of Transportation (Medical): Not on file    Lack of Transportation (Non-Medical):  Not on file   Physical Activity:     Days of Exercise per Week: Not on file    Minutes of Exercise per Session: Not on file   Stress:     Feeling of Stress : Not on file   Social Connections:     Frequency of Communication with Friends and Family: Not on file    Frequency of Social Gatherings with Friends and Family: Not on file    Attends [employer] Services: Not on file    Active Member of [address] [place] [address] [employer] or Organizations: Not on file    Attends Club or Organization Meetings: Not on file    Marital Status: Not on file   Intimate Partner Violence:     Fear of Current or Ex-Partner: Not on file    Emotionally Abused: Not on file    Physically Abused: Not on file    Sexually Abused: Not on file   Housing Stability:     Unable to Pay for Housing in the Last Year: Not on file    Number of [place] in the Last Year: Not on file    Unstable Housing in the Last Year: Not on file        Family History   Problem Relation Age of Onset    Cancer Mother         skin    High Cholesterol Mother     Other Sister         spinal stenosis    Coronary Art Dis Father        Vitals:    01/31/22 0821   BP: 125/68   Site: Left Upper Arm   Position: Sitting   Cuff Size: Large Adult   Pulse: 65   Resp: 15   Temp: 96.8 °F (36 °C)   TempSrc: Temporal   SpO2: 98%   Weight: 215 lb (97.5 kg)   Height: 6' (1.829 m)     Estimated body mass index is 29.16 kg/m² as calculated from the following:    Height as of this encounter: 6' (1.829 m). Weight as of this encounter: 215 lb (97.5 kg). PHYSICAL EXAM  GENERAL:  Pleasant  male who looks his stated age, awake alert and oriented x3, no acute distress. SKIN: Jarquin type II skin. Freckles. Back SK. Stork bite  NEURO: Cranial 2 through 12 grossly intact. No cogwheeling or resting tremor. Normal muscle bulk and tone. Symmetrically diminished deep tendon reflexes. Symmetric tavon in the hands and feet. No past-pointing. PSYCH:  No psychomotor retardation or agitation. Good eye contact. Unrestricted affect range. Mood congruent with affect. Linear thought. LABS  Lab Review   Orders Only on 11/11/2021   Component Date Value    Cholesterol, Total 11/11/2021 125     Triglycerides 11/11/2021 58     HDL 11/11/2021 46     LDL Calculated 11/11/2021 67     VLDL Cholesterol Calcula* 11/11/2021 12     AST 11/11/2021 27     ALT 11/11/2021 23     Sodium 11/11/2021 139     Potassium 11/11/2021 4.3     Chloride 11/11/2021 101     CO2 11/11/2021 26     Anion Gap 11/11/2021 12     Glucose 11/11/2021 102*    BUN 11/11/2021 16     CREATININE 11/11/2021 0.8*    GFR Non- 11/11/2021 >60     GFR  11/11/2021 >60     Calcium 11/11/2021 9.6     TSH 11/11/2021 3.23     Vit D, 25-Hydroxy 11/11/2021 44.0     Vitamin B-12 11/11/2021 682     Hepatitis C Virus Ab by * 11/11/2021 0.06     Hepatitis C Virus Ab by * 11/11/2021 Negative    Orders Only on 12/28/2020   Component Date Value    Magnesium 12/28/2020 2.30          ASSESSMENT/PLAN  1. Right cervical radiculopathy  C67 distribution, previously improved with PT before declines Neurontin though scratching at the skin a fair amount. Saw Ortho who proceeded with sickle therapy home traction unit home exercise program as well as PT for the shoulder. Plan for shoulder MRI if symptoms persist, EMG also ordered.     2. Coronary artery disease involving native coronary artery of native heart without angina pectoris  Asymptomatic currently. Reassuring BMP. 3. Pure hypercholesterolemia  On medium dose Crestor and Zetia, LDL well above 100 on last check, fortunately LDL now 67 with good triglycerides, typical male HDL, normal LFTs. However to give him best recurrence risk, his cardiologist 9 concurred in intensifying him to high intensity statin, update LFTs lipid profile in 3 months. 4. Routine adult health maintenance  Tdap 2021 Pneumovax not indicated Shingrix 2019 and influenza due, provided today. Shingrix Covid up-to-date. Normal colonoscopy December 2012, repeat December 2022. Reassuring TSH B12 hepatitis C screen 2021.      5.  Erectile dysfunction. Introduced the idea of taking daily low-dose, in setting of BPH may be of interest, which he discussed with his cardiologist who also noted some cardiac benefit. Rx 5mg daily sent to mail order, may need to go to local pharmacy. 6.  BPH. Nocturia x3. Reviewed nocturnal hygiene. Not currently on any treatment. Has not seen urology for a few years. We will update his PSA. 7.  GERD. Atypical chest pain since 2015 unrelated to meals. Dilated, no red flag symptoms compliant with Prilosec, recently transition to over-the-counter Nexium to good effect. Dr. Tyrone Bowles taking over for [place]    8. Mild stutter. Patient has for the past couple of weeks stuttered no more than 10 times a day, primarily in the form of a brief pause. Has occurred in anytime a day, he does not believe it is tied to anxiety or stress or fatigue. He has not noticed any effect on alcohol really has not drunk much since it started. No family history of neurodegenerative disease such as Parkinson's disease. He had a couple of aunts in their eighties who lived with dementia. Neuro exam pretty unremarkable, will refer to neurology for second opinion. Return in about 6 months (around 7/31/2022). It was a pleasure to visit with  Oliva Bull today.   Answered all questions as best I could.   Laurel Kitchen MD   Time 28 minutes

## 2022-01-31 NOTE — PATIENT INSTRUCTIONS
1. Fasting blood work around 4/18/22, will sent letter & put into [employer]. 2. Rx for cialis 5 crestor 40 sent to mail order rx  3. Ref neuro  4.  See me in 6 months

## 2022-02-07 RX ORDER — PANTOPRAZOLE SODIUM 20 MG/1
20 TABLET, DELAYED RELEASE ORAL
Qty: 90 TABLET | Refills: 1 | Status: SHIPPED | OUTPATIENT
Start: 2022-02-07 | End: 2022-08-01

## 2022-02-07 NOTE — TELEPHONE ENCOUNTER
Received incoming fax that Rx Nexium is not covered under pt plan.  Requesting a covered alternatives: Omeprazole, pantoprazole, rabeprazole or Dexilant

## 2022-06-06 NOTE — TELEPHONE ENCOUNTER
Future Appointments    Encounter Information    Provider Department Appt Notes   8/1/2022 Juan Manuel Lion MD [facility] Primary Care 6 mth F/U       Past Visits    Date Provider Specialty Visit Type Primary Dx   01/31/2022 Juan Manuel Lion MD Primary Care Office Visit Benign prostatic hyperplasia, unspecified whether lower urinary tract symptoms present

## 2022-06-07 RX ORDER — TADALAFIL 5 MG/1
5 TABLET ORAL DAILY
Qty: 90 TABLET | Refills: 3 | Status: SHIPPED | OUTPATIENT
Start: 2022-06-07 | End: 2022-08-03 | Stop reason: SDUPTHER

## 2022-07-12 RX ORDER — EZETIMIBE 10 MG/1
10 TABLET ORAL DAILY
Qty: 90 TABLET | Refills: 3 | OUTPATIENT
Start: 2022-07-12

## 2022-07-29 NOTE — PROGRESS NOTES
2022    Sergio Jackson (:  1965) is a 65 y.o. male, here for evaluation of the following medical concerns:    Chief Complaint   Patient presents with    6 Month Follow-Up       HPI  43-year-old white male last seen by his former PCP in 2019, with history of coronary artery disease GERD c/b Barretts followed in GI, BPH followed in urology rhinitis D deficiency who recently established care following his providers relocation, who comes in for     Within the past few months we intensified lipid-lowering therapy, referred to neurology for new onset stutter for second opinion. Has not done lipid panel ALT PSA ordered 2022. Asked patient to check if Cologuard was covered by insurance. We also organized PT Ortho for recurrent cervical radiculopathy, Ortho felt also tendinitis component. Patient seemingly never  did EMG. Followed by Dr. Madeleine Fraser.  pCircumflex PCI GLORIA . Negative stress echo 2018, achieving nearly 13 METS. Cardiologist provided prescription for Cialis in 2019 for ED. Review of Systems   Constitutional: Negative for activity change, appetite change, fatigue and unexpected weight change. HENT: Negative for dental problem, sinus pain, sore throat and trouble swallowing. Eyes: Negative for pain and visual disturbance. Respiratory: Negative for apnea, cough, chest tightness, shortness of breath and wheezing. Cardiovascular: Negative for chest pain and palpitations. Gastrointestinal: Negative for abdominal pain, blood in stool, constipation, diarrhea, nausea, rectal pain and vomiting. Endocrine: Negative for cold intolerance, heat intolerance, polydipsia, polyphagia and polyuria. Genitourinary: Negative for difficulty urinating, dysuria, flank pain, frequency, hematuria, pelvic pain, urgency, vaginal bleeding and vaginal discharge.    Musculoskeletal: Negative for arthralgias, back pain, gait problem, joint swelling, myalgias, neck pain and neck stiffness. Skin: Negative for color change and rash. Neurological: Negative for dizziness, tremors, syncope, speech difficulty, weakness, light-headedness and headaches. Hematological: Negative for adenopathy. Does not bruise/bleed easily. Psychiatric/Behavioral: Negative for agitation, behavioral problems, decreased concentration, sleep disturbance and suicidal ideas. The patient is not nervous/anxious and is not hyperactive. Prior to Visit Medications    Medication Sig Taking? Authorizing Provider   ezetimibe (ZETIA) 10 MG tablet Take 1 tablet by mouth in the morning. Yes Roxane Garcia MD   esomeprazole (NEXIUM) 20 MG delayed release capsule Take 1 capsule by mouth every morning (before breakfast) Yes Roxane Garcia MD   tadalafil (CIALIS) 5 MG tablet TAKE 1 TABLET BY MOUTH  DAILY Yes Roxane Garcia MD   pantoprazole (PROTONIX) 20 MG tablet Take 1 tablet by mouth every morning (before breakfast) Yes Roxane Garcia MD   Cholecalciferol (VITAMIN D) 50 MCG (2000 UT) CAPS capsule Take by mouth Yes Historical Provider, MD   rosuvastatin (CRESTOR) 40 MG tablet Take 1 tablet by mouth daily Yes Roxane Garcia MD   aspirin 81 MG EC tablet Take 81 mg by mouth daily. Yes Historical Provider, MD   Cetirizine HCl (ZYRTEC PO) Take  by mouth.    Yes Historical Provider, MD   vitamin D (ERGOCALCIFEROL) 36452 units CAPS capsule Take 1 capsule by mouth once a week for 8 doses  Percy Lager, DO        No Known Allergies    Past Medical History:   Diagnosis Date    CAD (coronary artery disease) 4/10    ptca LAD Pelberg stent    Hyperlipidemia        Past Surgical History:   Procedure Laterality Date    APPENDECTOMY         Social History     Socioeconomic History    Marital status:      Spouse name: Not on file    Number of children: Not on file    Years of education: Not on file    Highest education level: Not on file   Occupational History    Not on file   Tobacco Use    Smoking status: Never ALT 11/11/2021 23     Sodium 11/11/2021 139     Potassium 11/11/2021 4.3     Chloride 11/11/2021 101     CO2 11/11/2021 26     Anion Gap 11/11/2021 12     Glucose 11/11/2021 102 (A)    BUN 11/11/2021 16     Creatinine 11/11/2021 0.8 (A)    GFR Non- 11/11/2021 >60     GFR  11/11/2021 >60     Calcium 11/11/2021 9.6     TSH 11/11/2021 3.23     Vit D, 25-Hydroxy 11/11/2021 44.0     Vitamin B-12 11/11/2021 682     Hepatitis C Virus Ab by * 11/11/2021 0.06     Hepatitis C Virus Ab by * 11/11/2021 Negative          ASSESSMENT/PLAN  1. Right cervical radiculopathy  C67 distribution, previously improved with PT before, improved with chiropractic therapy now. 2. Coronary artery disease involving native coronary artery of native heart without angina pectoris  Asymptomatic currently. Reassuring BMP. 3. Pure hypercholesterolemia  On medium dose Crestor and Zetia, LDL well above 100 on last check, fortunately LDL now 67 with good triglycerides, typical male HDL, normal LFTs. However to give him best recurrence risk, his cardiologist 9 concurred in intensifying him to high intensity statin, update LFTs lipid profile now. 4. Routine adult health maintenance  Tdap 2021 Pneumovax not indicated Shingrix 2019 and influenza due, provided today. Shingrix Covid up-to-date. Normal colonoscopy December 2012, repeat due December 2022. Reassuring TSH B12 hepatitis C screen 2021. Discussed Cologuard he will consider alternatively proceed with colonoscopy later this year with Dr. Fina Lemus PSA now    5. Erectile dysfunction. Introduced the idea of taking daily low-dose, in setting of BPH may be of interest, which he discussed with his cardiologist who also noted some cardiac benefit. Rx 5mg daily sent to mail order, may need to go to local pharmacy. 6.  BPH. Nocturia x3. Reviewed nocturnal hygiene. Not currently on any treatment. Has not seen urology for a few years.   We will update his PSA. 7.  GERD. Atypical chest pain since 2015 unrelated to meals. Dilated, no red flag symptoms compliant with Prilosec, recently transition to over-the-counter Nexium to good effect. Dr. Ambrosio Santizo taking over for [place]    8. Mild stutter. Few months ago patient couple weeks of couple of weeks stuttered no more than 10 times a day, primarily in the form of a brief pause. Has occurred in anytime a day, he does not believe it is tied to anxiety or stress or fatigue. He has not noticed any effect on alcohol really has not drunk much since it started. No family history of neurodegenerative disease such as Parkinson's disease. He had a couple of aunts in their eighties who lived with dementia. Neuro exam pretty unremarkable, he never followed up with neurology for second opinion because symptoms subsided on their own without recurrence. Observation only at this point. 9.  Bilateral great toe onychomycosis. After 3 months Lamisil somewhat suboptimal response for which I asked him to send a picture of his toes to his dermatologist for consideration of avulsion versus therapy switch to itraconazole. We are obtaining baseline ALT today. Return in about 6 months (around 2/1/2023) for Dr. Miky Melendez. It was a pleasure to visit with Mr. Melinda Madera today. Answered all questions as best I could.   Rae Reynolds MD   Time 28 minutes

## 2022-08-01 ENCOUNTER — OFFICE VISIT (OUTPATIENT)
Dept: PRIMARY CARE CLINIC | Age: 57
End: 2022-08-01
Payer: COMMERCIAL

## 2022-08-01 VITALS
DIASTOLIC BLOOD PRESSURE: 69 MMHG | HEART RATE: 57 BPM | OXYGEN SATURATION: 98 % | BODY MASS INDEX: 26.99 KG/M2 | TEMPERATURE: 97 F | WEIGHT: 199 LBS | SYSTOLIC BLOOD PRESSURE: 122 MMHG

## 2022-08-01 DIAGNOSIS — Z12.11 COLON CANCER SCREENING: ICD-10-CM

## 2022-08-01 DIAGNOSIS — B35.1 ONYCHOMYCOSIS: ICD-10-CM

## 2022-08-01 DIAGNOSIS — E78.00 PURE HYPERCHOLESTEROLEMIA: Primary | ICD-10-CM

## 2022-08-01 DIAGNOSIS — Z00.00 ROUTINE ADULT HEALTH MAINTENANCE: ICD-10-CM

## 2022-08-01 PROCEDURE — 1036F TOBACCO NON-USER: CPT | Performed by: INTERNAL MEDICINE

## 2022-08-01 PROCEDURE — G8419 CALC BMI OUT NRM PARAM NOF/U: HCPCS | Performed by: INTERNAL MEDICINE

## 2022-08-01 PROCEDURE — 99213 OFFICE O/P EST LOW 20 MIN: CPT | Performed by: INTERNAL MEDICINE

## 2022-08-01 PROCEDURE — G8427 DOCREV CUR MEDS BY ELIG CLIN: HCPCS | Performed by: INTERNAL MEDICINE

## 2022-08-01 PROCEDURE — 3017F COLORECTAL CA SCREEN DOC REV: CPT | Performed by: INTERNAL MEDICINE

## 2022-08-01 RX ORDER — EZETIMIBE 10 MG/1
10 TABLET ORAL DAILY
Qty: 90 TABLET | Refills: 3 | Status: SHIPPED | OUTPATIENT
Start: 2022-08-01 | End: 2022-08-03 | Stop reason: SDUPTHER

## 2022-08-01 ASSESSMENT — PATIENT HEALTH QUESTIONNAIRE - PHQ9
SUM OF ALL RESPONSES TO PHQ QUESTIONS 1-9: 0
SUM OF ALL RESPONSES TO PHQ9 QUESTIONS 1 & 2: 0
1. LITTLE INTEREST OR PLEASURE IN DOING THINGS: 0
SUM OF ALL RESPONSES TO PHQ QUESTIONS 1-9: 0
2. FEELING DOWN, DEPRESSED OR HOPELESS: 0

## 2022-08-01 NOTE — PATIENT INSTRUCTIONS
1.  Follow-up response to lipid-lowering therapy intensification by fasting blood test.  We will update PSA at that time  2. Zetia refilled  3. Nexium refilled  4. Send pix to Dr Lluvia Jasmine re toenails, perhaps needs avulsion or itraconazole segue to Penlac prophylaxis. 5 Will send letter  6 Est care Dr Horace Craft  in 6 months  7.   Let us know if he wishes to order Cologuard instead of colonoscopy see Dr. Esvin Pitts for colonoscopy when due

## 2022-08-03 RX ORDER — EZETIMIBE 10 MG/1
10 TABLET ORAL DAILY
Qty: 90 TABLET | Refills: 3 | Status: SHIPPED | OUTPATIENT
Start: 2022-08-03

## 2022-08-03 RX ORDER — ROSUVASTATIN CALCIUM 40 MG/1
40 TABLET, COATED ORAL DAILY
Qty: 90 TABLET | Refills: 3 | Status: SHIPPED | OUTPATIENT
Start: 2022-08-03

## 2022-08-03 RX ORDER — TADALAFIL 5 MG/1
5 TABLET ORAL DAILY
Qty: 90 TABLET | Refills: 3 | Status: SHIPPED | OUTPATIENT
Start: 2022-08-03

## 2022-08-03 RX ORDER — MULTIVIT-MIN/IRON/FOLIC ACID/K 18-600-40
2000 CAPSULE ORAL DAILY
Qty: 90 CAPSULE | Refills: 3 | Status: SHIPPED | OUTPATIENT
Start: 2022-08-03

## 2022-08-20 ENCOUNTER — PATIENT MESSAGE (OUTPATIENT)
Dept: PRIMARY CARE CLINIC | Age: 57
End: 2022-08-20

## 2022-08-22 NOTE — TELEPHONE ENCOUNTER
From: Unknown [place]  To: Dr. Susan Gonzalez: 8/20/2022 6:08 PM EDT  Subject: Hernia    Hi Claude, Im concerned I may have herniated my groin playing roller hockey. It doesnt keep me from playing, but it feels strained and it doesnt seem to improving. Please advise.      Thank you,    Fabiana Lemus

## 2022-08-30 ENCOUNTER — NURSE TRIAGE (OUTPATIENT)
Dept: OTHER | Facility: CLINIC | Age: 57
End: 2022-08-30

## 2022-08-30 ENCOUNTER — OFFICE VISIT (OUTPATIENT)
Dept: PRIMARY CARE CLINIC | Age: 57
End: 2022-08-30
Payer: COMMERCIAL

## 2022-08-30 VITALS
DIASTOLIC BLOOD PRESSURE: 68 MMHG | WEIGHT: 197 LBS | HEIGHT: 72 IN | BODY MASS INDEX: 26.68 KG/M2 | SYSTOLIC BLOOD PRESSURE: 109 MMHG | TEMPERATURE: 97 F | HEART RATE: 68 BPM | OXYGEN SATURATION: 98 %

## 2022-08-30 DIAGNOSIS — R10.32 BILATERAL GROIN PAIN: ICD-10-CM

## 2022-08-30 DIAGNOSIS — Z13.0 SCREENING FOR DEFICIENCY ANEMIA: Primary | ICD-10-CM

## 2022-08-30 DIAGNOSIS — R10.31 BILATERAL GROIN PAIN: ICD-10-CM

## 2022-08-30 PROCEDURE — G8419 CALC BMI OUT NRM PARAM NOF/U: HCPCS | Performed by: NURSE PRACTITIONER

## 2022-08-30 PROCEDURE — 1036F TOBACCO NON-USER: CPT | Performed by: NURSE PRACTITIONER

## 2022-08-30 PROCEDURE — 99213 OFFICE O/P EST LOW 20 MIN: CPT | Performed by: NURSE PRACTITIONER

## 2022-08-30 PROCEDURE — G8427 DOCREV CUR MEDS BY ELIG CLIN: HCPCS | Performed by: NURSE PRACTITIONER

## 2022-08-30 PROCEDURE — 3017F COLORECTAL CA SCREEN DOC REV: CPT | Performed by: NURSE PRACTITIONER

## 2022-08-30 ASSESSMENT — PATIENT HEALTH QUESTIONNAIRE - PHQ9
SUM OF ALL RESPONSES TO PHQ QUESTIONS 1-9: 0
1. LITTLE INTEREST OR PLEASURE IN DOING THINGS: 0
SUM OF ALL RESPONSES TO PHQ QUESTIONS 1-9: 0
SUM OF ALL RESPONSES TO PHQ9 QUESTIONS 1 & 2: 0
2. FEELING DOWN, DEPRESSED OR HOPELESS: 0
SUM OF ALL RESPONSES TO PHQ QUESTIONS 1-9: 0
SUM OF ALL RESPONSES TO PHQ QUESTIONS 1-9: 0

## 2022-08-30 NOTE — PROGRESS NOTES
Abdomen is soft. There is no mass. Tenderness: There is no abdominal tenderness. There is no right CVA tenderness, left CVA tenderness, guarding or rebound. Hernia: No hernia is present. Comments: No pain with palpation of groin area. Musculoskeletal:         General: Normal range of motion. Lymphadenopathy:      Head:      Right side of head: No submental or submandibular adenopathy. Left side of head: No submental or submandibular adenopathy. Cervical:      Right cervical: No superficial cervical adenopathy. Left cervical: No superficial cervical adenopathy. Skin:     General: Skin is warm and dry. Findings: No rash. Nails: There is no clubbing. Neurological:      Mental Status: He is alert and oriented to person, place, and time. GCS: GCS eye subscore is 4. GCS verbal subscore is 5. GCS motor subscore is 6. Psychiatric:         Speech: Speech normal.         Behavior: Behavior normal. Behavior is cooperative. Judgment: Judgment normal.          On this date 8/30/2022 I have spent 15 minutes reviewing previous notes, test results and face to face with the patient discussing the diagnosis and importance of compliance with the treatment plan as well as documenting on the day of the visit. An electronic signature was used to authenticate this note.     --CALOS Stafford - CNP

## 2023-04-03 ENCOUNTER — OFFICE VISIT (OUTPATIENT)
Dept: PRIMARY CARE CLINIC | Age: 58
End: 2023-04-03
Payer: COMMERCIAL

## 2023-04-03 VITALS
OXYGEN SATURATION: 98 % | DIASTOLIC BLOOD PRESSURE: 83 MMHG | HEART RATE: 48 BPM | WEIGHT: 200 LBS | TEMPERATURE: 97.3 F | SYSTOLIC BLOOD PRESSURE: 132 MMHG | BODY MASS INDEX: 27.12 KG/M2

## 2023-04-03 DIAGNOSIS — I25.10 CORONARY ARTERY DISEASE INVOLVING NATIVE CORONARY ARTERY OF NATIVE HEART WITHOUT ANGINA PECTORIS: ICD-10-CM

## 2023-04-03 DIAGNOSIS — Z12.5 PROSTATE CANCER SCREENING: ICD-10-CM

## 2023-04-03 DIAGNOSIS — E55.9 VITAMIN D DEFICIENCY: ICD-10-CM

## 2023-04-03 DIAGNOSIS — N40.0 ENLARGED PROSTATE: ICD-10-CM

## 2023-04-03 DIAGNOSIS — Z95.5 PRESENCE OF DRUG COATED STENT IN LEFT CIRCUMFLEX CORONARY ARTERY: ICD-10-CM

## 2023-04-03 DIAGNOSIS — Z13.1 DIABETES MELLITUS SCREENING: ICD-10-CM

## 2023-04-03 DIAGNOSIS — N50.89 ENLARGED TESTICLE: ICD-10-CM

## 2023-04-03 DIAGNOSIS — E78.2 MIXED HYPERLIPIDEMIA: Primary | ICD-10-CM

## 2023-04-03 PROCEDURE — 1036F TOBACCO NON-USER: CPT | Performed by: INTERNAL MEDICINE

## 2023-04-03 PROCEDURE — 3017F COLORECTAL CA SCREEN DOC REV: CPT | Performed by: INTERNAL MEDICINE

## 2023-04-03 PROCEDURE — 99214 OFFICE O/P EST MOD 30 MIN: CPT | Performed by: INTERNAL MEDICINE

## 2023-04-03 PROCEDURE — G8427 DOCREV CUR MEDS BY ELIG CLIN: HCPCS | Performed by: INTERNAL MEDICINE

## 2023-04-03 PROCEDURE — G8419 CALC BMI OUT NRM PARAM NOF/U: HCPCS | Performed by: INTERNAL MEDICINE

## 2023-04-03 RX ORDER — EZETIMIBE 10 MG/1
10 TABLET ORAL DAILY
Qty: 90 TABLET | Refills: 3 | Status: SHIPPED | OUTPATIENT
Start: 2023-04-03 | End: 2023-04-03 | Stop reason: SDUPTHER

## 2023-04-03 RX ORDER — TADALAFIL 5 MG/1
5 TABLET ORAL DAILY
Qty: 90 TABLET | Refills: 3 | Status: SHIPPED | OUTPATIENT
Start: 2023-04-03 | End: 2023-04-03 | Stop reason: SDUPTHER

## 2023-04-03 RX ORDER — ROSUVASTATIN CALCIUM 40 MG/1
40 TABLET, COATED ORAL DAILY
Qty: 90 TABLET | Refills: 3 | Status: SHIPPED | OUTPATIENT
Start: 2023-04-03

## 2023-04-03 RX ORDER — EZETIMIBE 10 MG/1
10 TABLET ORAL DAILY
Qty: 90 TABLET | Refills: 3 | Status: SHIPPED | OUTPATIENT
Start: 2023-04-03

## 2023-04-03 RX ORDER — ROSUVASTATIN CALCIUM 40 MG/1
40 TABLET, COATED ORAL DAILY
Qty: 90 TABLET | Refills: 3 | Status: SHIPPED | OUTPATIENT
Start: 2023-04-03 | End: 2023-04-03 | Stop reason: SDUPTHER

## 2023-04-03 RX ORDER — TADALAFIL 5 MG/1
5 TABLET ORAL DAILY
Qty: 90 TABLET | Refills: 3 | Status: SHIPPED | OUTPATIENT
Start: 2023-04-03

## 2023-04-03 SDOH — ECONOMIC STABILITY: FOOD INSECURITY: WITHIN THE PAST 12 MONTHS, THE FOOD YOU BOUGHT JUST DIDN'T LAST AND YOU DIDN'T HAVE MONEY TO GET MORE.: NEVER TRUE

## 2023-04-03 SDOH — ECONOMIC STABILITY: INCOME INSECURITY: HOW HARD IS IT FOR YOU TO PAY FOR THE VERY BASICS LIKE FOOD, HOUSING, MEDICAL CARE, AND HEATING?: NOT HARD AT ALL

## 2023-04-03 SDOH — ECONOMIC STABILITY: FOOD INSECURITY: WITHIN THE PAST 12 MONTHS, YOU WORRIED THAT YOUR FOOD WOULD RUN OUT BEFORE YOU GOT MONEY TO BUY MORE.: NEVER TRUE

## 2023-04-03 SDOH — ECONOMIC STABILITY: HOUSING INSECURITY
IN THE LAST 12 MONTHS, WAS THERE A TIME WHEN YOU DID NOT HAVE A STEADY PLACE TO SLEEP OR SLEPT IN A SHELTER (INCLUDING NOW)?: NO

## 2023-04-03 ASSESSMENT — PATIENT HEALTH QUESTIONNAIRE - PHQ9
SUM OF ALL RESPONSES TO PHQ QUESTIONS 1-9: 0
2. FEELING DOWN, DEPRESSED OR HOPELESS: 0
SUM OF ALL RESPONSES TO PHQ9 QUESTIONS 1 & 2: 0
1. LITTLE INTEREST OR PLEASURE IN DOING THINGS: 0
SUM OF ALL RESPONSES TO PHQ QUESTIONS 1-9: 0

## 2023-04-03 ASSESSMENT — ENCOUNTER SYMPTOMS: EYES NEGATIVE: 1

## 2023-04-03 NOTE — PROGRESS NOTES
dry.   Neurological:      General: No focal deficit present. Mental Status: He is alert and oriented to person, place, and time. Cranial Nerves: No cranial nerve deficit. Sensory: No sensory deficit. Motor: No weakness. Coordination: Coordination normal.      Gait: Gait normal.      Deep Tendon Reflexes: Reflexes normal.   Psychiatric:         Mood and Affect: Mood normal.         Behavior: Behavior normal.         Thought Content: Thought content normal.         Judgment: Judgment normal.                An electronic signature was used to authenticate this note.     --Elena Webb MD

## 2023-04-06 PROBLEM — Z13.1 DIABETES MELLITUS SCREENING: Status: ACTIVE | Noted: 2023-04-06

## 2023-04-06 PROBLEM — Z12.5 PROSTATE CANCER SCREENING: Status: ACTIVE | Noted: 2023-04-06

## 2023-04-06 PROBLEM — Z95.5 PRESENCE OF DRUG COATED STENT IN LEFT CIRCUMFLEX CORONARY ARTERY: Status: ACTIVE | Noted: 2023-04-06

## 2023-04-06 PROBLEM — E55.9 VITAMIN D DEFICIENCY: Status: ACTIVE | Noted: 2023-04-06

## 2023-04-06 PROBLEM — N50.89 ENLARGED TESTICLE: Status: ACTIVE | Noted: 2023-04-06

## 2023-04-06 ASSESSMENT — ENCOUNTER SYMPTOMS
CHEST TIGHTNESS: 0
GASTROINTESTINAL NEGATIVE: 1
SHORTNESS OF BREATH: 0

## 2023-05-06 PROBLEM — Z12.5 PROSTATE CANCER SCREENING: Status: RESOLVED | Noted: 2023-04-06 | Resolved: 2023-05-06

## 2023-07-11 ENCOUNTER — PATIENT MESSAGE (OUTPATIENT)
Dept: PRIMARY CARE CLINIC | Age: 58
End: 2023-07-11

## 2023-07-11 NOTE — TELEPHONE ENCOUNTER
From: [place]  To: Dr. Morillo School: 7/11/2023 7:47 AM EDT  Subject: Rachel Jacques / Horace Oreilly,    You sent the following after my last visit, but I'm just now reading. .. 'In epic the order is LAB 1048. This is the test that we discussed that actually check the particle size of the LDL cholesterol. If I round up the cents the cost of the test is $113 dollars . If you want me to, I can delete the lipid order and add this test. Also I want you to continue to see the cardiologist yearly, even though you are not having problems, because of your history of having a serious condition with minimal atypical symptoms. Send me a [employer] message to let me know. Take care! \"    I would like to proceed with this test based on your recommendation. Please let me know next steps. On a separate note, I've strained a muscle in my neck and would like to get some Cyclobenzaprine to treat it. This happens once every few years and this always helps tremendously. Please let me know if this is possible.     Thank you,    Quiana Solomon

## 2023-07-17 DIAGNOSIS — M79.18 MUSCULOSKELETAL PAIN: Primary | ICD-10-CM

## 2023-07-17 RX ORDER — CYCLOBENZAPRINE HCL 10 MG
10 TABLET ORAL 3 TIMES DAILY PRN
Qty: 30 TABLET | Refills: 0 | Status: SHIPPED | OUTPATIENT
Start: 2023-07-17 | End: 2023-07-18 | Stop reason: SDUPTHER

## 2023-07-18 ENCOUNTER — TELEPHONE (OUTPATIENT)
Dept: PRIMARY CARE CLINIC | Age: 58
End: 2023-07-18

## 2023-07-18 DIAGNOSIS — M62.838 MUSCLE SPASM: Primary | ICD-10-CM

## 2023-07-18 RX ORDER — CYCLOBENZAPRINE HCL 10 MG
10 TABLET ORAL 3 TIMES DAILY PRN
Qty: 30 TABLET | Refills: 0 | Status: SHIPPED | OUTPATIENT
Start: 2023-07-18 | End: 2024-07-17

## 2023-07-18 NOTE — TELEPHONE ENCOUNTER
Cyclobenzaprine canceled at [facility] and sent to [facility] in [place] notification sent to patient.

## 2023-07-18 NOTE — TELEPHONE ENCOUNTER
Patient would like medication cyclobenzaprine (FLEXERIL) 10 MG to be forwarded to [facility] at [address] [place] [address]

## 2023-08-07 ENCOUNTER — OFFICE VISIT (OUTPATIENT)
Dept: ORTHOPEDIC SURGERY | Age: 58
End: 2023-08-07
Payer: COMMERCIAL

## 2023-08-07 VITALS — HEIGHT: 72 IN | WEIGHT: 205.4 LBS | BODY MASS INDEX: 27.82 KG/M2 | RESPIRATION RATE: 16 BRPM

## 2023-08-07 DIAGNOSIS — M25.511 RIGHT SHOULDER PAIN, UNSPECIFIED CHRONICITY: Primary | ICD-10-CM

## 2023-08-07 PROCEDURE — 1036F TOBACCO NON-USER: CPT | Performed by: ORTHOPAEDIC SURGERY

## 2023-08-07 PROCEDURE — G8427 DOCREV CUR MEDS BY ELIG CLIN: HCPCS | Performed by: ORTHOPAEDIC SURGERY

## 2023-08-07 PROCEDURE — 99213 OFFICE O/P EST LOW 20 MIN: CPT | Performed by: ORTHOPAEDIC SURGERY

## 2023-08-07 PROCEDURE — 3017F COLORECTAL CA SCREEN DOC REV: CPT | Performed by: ORTHOPAEDIC SURGERY

## 2023-08-07 PROCEDURE — G8419 CALC BMI OUT NRM PARAM NOF/U: HCPCS | Performed by: ORTHOPAEDIC SURGERY

## 2023-08-07 NOTE — PROGRESS NOTES
CHIEF COMPLAINT: Right shoulder pain    History:    Bertrand Gil is a 63 y.o. right handed male here for right shoulder follow-up. Initial history: referred by Enrrique Cottrell MD for Sports Medicine consultation for evaluation and treatment of Right shoulder pain. This is evaluated as a personal injury. The pain began 6 months ago. Pain is rated as a 4/10. There was not an injury. He points to pain being located along the lateral aspect of the shoulder. Pain is aggravated with reaching across his body and behind his back. The patient has not had PT. The patient has not had an injection. The patient has not tried NSAIDs. The patient has not tried ice. He states he was raking leaves yesterday, and that did actually improve his symptoms. Patient's occupation is . He does note some numbness in his arm. He is scheduled for EMG next week. Interval History: His shoulder has been doing well and almost 95% improved since the last visit 2 years ago 1 month ago, he was doing ring dips and felt immediate pain. Pain is located laterally. Symptoms are worse with overactivity, laying on side, adduction, and reaching behind his back. He has been doing home exercise program, which he describes as rotator cuff strengthening using a dumbbell, as well as periscapular strengthening with rows. He has tried naproxen without relief he has tried ice with relief. He has not had physical therapy or injection.         Past Medical History:   Diagnosis Date    CAD (coronary artery disease) 4/10    ptca LAD Pelberg stent    Hyperlipidemia        Past Surgical History:   Procedure Laterality Date    APPENDECTOMY         Current Outpatient Medications on File Prior to Visit   Medication Sig Dispense Refill    cyclobenzaprine (FLEXERIL) 10 MG tablet Take 1 tablet by mouth 3 times daily as needed for Muscle spasms 30 tablet 0    ezetimibe (ZETIA) 10 MG tablet Take 1 tablet by mouth daily 90 tablet 3

## 2023-08-08 ENCOUNTER — TELEPHONE (OUTPATIENT)
Dept: ORTHOPEDIC SURGERY | Age: 58
End: 2023-08-08

## 2023-08-08 NOTE — TELEPHONE ENCOUNTER
BEAU/CHRISSY Dove  regarding MRI RT SHOULDER approval and authorization being valid until 9/21/2023. Patient was instructed that their MRI has been scheduled  at [facility] [place]:  8/9/2023 at 6:30pm with an arrival time of 6:15pm.   [facility], [address]    The patient was instructed to contact the facility if there is a need to reschedule at [phone]. A follow up appointment has been scheduled for the patient for 8/15/2023 2:45PM at the [place] office. The patient was asked to contact the office to reschedule should this appointment be inconvenient for them. The patient was provided contact information should the need arise to reschedule any of the appointments. [facility] message also sent to Noemi Pressley.

## 2023-08-15 ENCOUNTER — OFFICE VISIT (OUTPATIENT)
Dept: ORTHOPEDIC SURGERY | Age: 58
End: 2023-08-15

## 2023-08-15 VITALS — HEIGHT: 72 IN | BODY MASS INDEX: 27.77 KG/M2 | WEIGHT: 205 LBS

## 2023-08-15 DIAGNOSIS — S43.431A SUPERIOR GLENOID LABRUM LESION OF RIGHT SHOULDER, INITIAL ENCOUNTER: ICD-10-CM

## 2023-08-15 DIAGNOSIS — M75.81 TENDINITIS OF RIGHT ROTATOR CUFF: Primary | ICD-10-CM

## 2023-08-15 RX ORDER — BUPIVACAINE HYDROCHLORIDE 2.5 MG/ML
4 INJECTION, SOLUTION INFILTRATION; PERINEURAL ONCE
Status: COMPLETED | OUTPATIENT
Start: 2023-08-15 | End: 2023-08-15

## 2023-08-15 RX ORDER — TRIAMCINOLONE ACETONIDE 40 MG/ML
40 INJECTION, SUSPENSION INTRA-ARTICULAR; INTRAMUSCULAR ONCE
Status: COMPLETED | OUTPATIENT
Start: 2023-08-15 | End: 2023-08-15

## 2023-08-15 RX ORDER — LIDOCAINE HYDROCHLORIDE 10 MG/ML
4 INJECTION, SOLUTION INFILTRATION; PERINEURAL ONCE
Status: COMPLETED | OUTPATIENT
Start: 2023-08-15 | End: 2023-08-15

## 2023-08-15 RX ADMIN — BUPIVACAINE HYDROCHLORIDE 10 MG: 2.5 INJECTION, SOLUTION INFILTRATION; PERINEURAL at 15:31

## 2023-08-15 RX ADMIN — TRIAMCINOLONE ACETONIDE 40 MG: 40 INJECTION, SUSPENSION INTRA-ARTICULAR; INTRAMUSCULAR at 15:31

## 2023-08-15 RX ADMIN — LIDOCAINE HYDROCHLORIDE 4 ML: 10 INJECTION, SOLUTION INFILTRATION; PERINEURAL at 15:31

## 2023-08-15 NOTE — PROGRESS NOTES
CHIEF COMPLAINT: Right shoulder pain    History:    Blanca Cavanaugh is a 63 y.o. right handed male here for right shoulder follow-up. Initial history: referred by Lise Gil MD for Sports Medicine consultation for evaluation and treatment of Right shoulder pain. This is evaluated as a personal injury. The pain began 6 months ago. Pain is rated as a 4/10. There was not an injury. He points to pain being located along the lateral aspect of the shoulder. Pain is aggravated with reaching across his body and behind his back. The patient has not had PT. The patient has not had an injection. The patient has not tried NSAIDs. The patient has not tried ice. He states he was raking leaves yesterday, and that did actually improve his symptoms. Patient's occupation is . He does note some numbness in his arm. He is scheduled for EMG next week. Interval History: Shoulder feels the same. Pain is rated 1-10/10.   Pain is located anterior and lateral.        Past Medical History:   Diagnosis Date    CAD (coronary artery disease) 4/10    ptca LAD Pelberg stent    Hyperlipidemia        Past Surgical History:   Procedure Laterality Date    APPENDECTOMY         Current Outpatient Medications on File Prior to Visit   Medication Sig Dispense Refill    cyclobenzaprine (FLEXERIL) 10 MG tablet Take 1 tablet by mouth 3 times daily as needed for Muscle spasms 30 tablet 0    ezetimibe (ZETIA) 10 MG tablet Take 1 tablet by mouth daily 90 tablet 3    rosuvastatin (CRESTOR) 40 MG tablet Take 1 tablet by mouth daily 90 tablet 3    tadalafil (CIALIS) 5 MG tablet Take 1 tablet by mouth daily 90 tablet 3    esomeprazole (NEXIUM) 20 MG delayed release capsule Take 1 capsule by mouth every morning (before breakfast) 90 capsule 3    Cholecalciferol (VITAMIN D) 50 MCG (2000 UT) CAPS capsule Take 2,000 Units by mouth daily 90 capsule 3    aspirin 81 MG EC tablet Take 1 tablet by mouth daily      Cetirizine HCl (ZYRTEC PO)

## 2023-08-17 DIAGNOSIS — K21.9 GASTROESOPHAGEAL REFLUX DISEASE, UNSPECIFIED WHETHER ESOPHAGITIS PRESENT: Primary | ICD-10-CM

## 2023-09-05 ENCOUNTER — HOSPITAL ENCOUNTER (OUTPATIENT)
Dept: PHYSICAL THERAPY | Age: 58
Setting detail: THERAPIES SERIES
Discharge: HOME | End: 2023-09-05
Payer: COMMERCIAL

## 2023-09-05 PROCEDURE — 97530 THERAPEUTIC ACTIVITIES: CPT

## 2023-09-05 PROCEDURE — 97162 PT EVAL MOD COMPLEX 30 MIN: CPT

## 2023-09-05 PROCEDURE — 97110 THERAPEUTIC EXERCISES: CPT

## 2023-09-05 NOTE — FLOWSHEET NOTE
[address] [facility]  Phone: [phone]   Fax: [phone]    Physical Therapy Daily Treatment Note    Date:  2023     Patient Name:  Zenon Cid    :  1965  MRN: [id number]  Medical Diagnosis:  Tendinitis of right rotator cuff [M75.81]  Superior glenoid labrum lesion of right shoulder, initial encounter [S43.431A]  Treatment Diagnosis:  forward head, dec RUE flexion, abduction, IR/ER strength, B UT muscle adhesions  Insurance/Certification information:  PT Insurance Information: [facility] ($30 copay, no auth, no ded)  Physician Information:  Gio Foster MD    Plan of care signed (Y/N): [x]  Yes []  No     Date of Patient follow up with Physician:      Progress Report: []  Yes  [x]  No     Date Range for reporting period:  Beginnin2023  Ending:     Progress report due (10 Rx/or 30 days whichever is less): visit #6 or     Recertification due (POC duration/ or 90 days whichever is less): visit #12 or 23    Visit # Insurance Allowable Auth required? Date Range    [facility] ($30 copay, no auth, no ded) []  Yes  []  No          Latex Allergy:  [x]NO      []YES  Preferred Language for Healthcare:   [x]English       []other:    Functional Scale:       Date assessed:  QDASH: raw score = npv ; dysfunction = npv%               Npv d/t time    Pain level:  2/10 posterior R shoulder     SUBJECTIVE:  See eval    OBJECTIVE: See eval  Observation:   Test measurements:      RESTRICTIONS/PRECAUTIONS:   Relevant Medical History:  MRI 23:  1. Moderate tendinopathy of the subscapularis tendon, with severe confluent tendinopathy of the    superior insertional fibers. Adjacent mild tendinopathy of the intra-articular long head   biceps tendon. 2. Nondisplaced SLAP type 2A tear of the superior and anterosuperior glenoid labrum. 3. Mild AC joint arthrosis, with mesoacromion-type os acromiale, and mild inflammation of the   synchondrosis.  No acute AC

## 2023-09-18 ENCOUNTER — APPOINTMENT (OUTPATIENT)
Dept: PHYSICAL THERAPY | Age: 58
End: 2023-09-18
Payer: COMMERCIAL

## 2023-09-19 ENCOUNTER — HOSPITAL ENCOUNTER (OUTPATIENT)
Dept: PHYSICAL THERAPY | Age: 58
Setting detail: THERAPIES SERIES
Discharge: HOME | End: 2023-09-19
Payer: COMMERCIAL

## 2023-09-19 PROCEDURE — 97110 THERAPEUTIC EXERCISES: CPT

## 2023-09-19 PROCEDURE — 97530 THERAPEUTIC ACTIVITIES: CPT

## 2023-09-19 PROCEDURE — 97112 NEUROMUSCULAR REEDUCATION: CPT

## 2023-09-19 NOTE — FLOWSHEET NOTE
[address]  Phone: [phone]   Fax: [phone]    Physical Therapy Daily Treatment Note    Date:  2023     Patient Name:  Rebeca Boateng    :  1965  MRN: [id number]  Medical Diagnosis:  Tendinitis of right rotator cuff [M75.81]  Superior glenoid labrum lesion of right shoulder, initial encounter [S43.429A]  Treatment Diagnosis:  forward head, dec RUE flexion, abduction, IR/ER strength, B UT muscle adhesions  Insurance/Certification information:  PT Insurance Information: [facility] ($30 copay, no auth, no ded)  Physician Information:  Chuck Norman MD    Plan of care signed (Y/N): [x]  Yes []  No     Date of Patient follow up with Physician:      Progress Report: []  Yes  [x]  No     Date Range for reporting period:  Beginnin2023  Ending:     Progress report due (10 Rx/or 30 days whichever is less): visit #6 or     Recertification due (POC duration/ or 90 days whichever is less): visit #12 or 23    Visit # Insurance Allowable Auth required? Date Range    [facility] ($30 copay, no auth, no ded) []  Yes  []  No          Latex Allergy:  [x]NO      []YES  Preferred Language for Healthcare:   [x]English       []other:    Functional Scale:       Date assessed:  QDASH: raw score = npv ; dysfunction = npv%               Npv d/t time    Pain level:  2/10 posterior R shoulder     SUBJECTIVE:  States has pain with lifting his arm and tries to avoid using it. Has been doing his HEP and feels like it has gotten a little bit better. Got a massage a week ago and states that improved his discomfort in his shoulder. OBJECTIVE:   : Slight B winged scapula  See eval  Observation:   Test measurements:      RESTRICTIONS/PRECAUTIONS:   Relevant Medical History:  MRI 23:  1. Moderate tendinopathy of the subscapularis tendon, with severe confluent tendinopathy of the    superior insertional fibers.  Adjacent mild tendinopathy of the

## 2023-09-25 ENCOUNTER — HOSPITAL ENCOUNTER (OUTPATIENT)
Dept: PHYSICAL THERAPY | Age: 58
Setting detail: THERAPIES SERIES
Discharge: HOME | End: 2023-09-25
Payer: COMMERCIAL

## 2023-09-25 NOTE — PROGRESS NOTES
[address] [facility]     Physical Therapy  Cancellation/No-show Note  Patient Name:  Mahin Scales  :  1965   Date:  2023  Cancelled visits to date: 1  No-shows to date: 0    Patient status for today's appointment patient:  [x]  Cancelled ,  []  Rescheduled appointment  []  No-show     Reason given by patient:  []  Patient ill  []  Conflicting appointment  []  No transportation    []  Conflict with work  [x]  No reason given  []  Other:     Comments:      Phone call information:   []  Phone call made today to patient at _ time at number provided:      []  Patient answered, conversation as follows:    []  Patient did not answer, message left as follows:  []  Phone call not made today  [x]  Phone call not needed - pt contacted us to cancel and provided reason for cancellation.      Electronically signed by:  Liane Garcia, PT, DPT

## 2023-10-02 ENCOUNTER — APPOINTMENT (OUTPATIENT)
Dept: PHYSICAL THERAPY | Age: 58
End: 2023-10-02
Payer: COMMERCIAL

## 2023-10-03 ENCOUNTER — HOSPITAL ENCOUNTER (OUTPATIENT)
Dept: PHYSICAL THERAPY | Age: 58
Setting detail: THERAPIES SERIES
Discharge: HOME | End: 2023-10-03

## 2023-10-03 NOTE — FLOWSHEET NOTE
[facility]     Physical Therapy  Cancellation/No-show Note  Patient Name:  Virginie Delcid  :  1965   Date:  10/3/2023  Cancelled visits to date: 1  No-shows to date: 1    Patient status for today's appointment patient:  []  [address] ,  []  Rescheduled appointment  [x]  No-show 10/3     Reason given by patient:  []  Patient ill  []  Conflicting appointment  []  No transportation    []  Conflict with work  [x]  No reason given  []  Other:     Comments:      Phone call information:   [x]  Phone call made today to patient at 2:29p time at number provided:      []  Patient answered, conversation as follows:    [x]  Patient did not answer, message left as follows: notified of missed appointment. Provided call back number to schedule appointments, as this is the last appointment scheduled. []  Phone call not made today  []  Phone call not needed - pt contacted us to cancel and provided reason for cancellation.      Electronically signed by:  Chago Castro, PT, DPT

## 2023-10-17 ENCOUNTER — OFFICE VISIT (OUTPATIENT)
Dept: ORTHOPEDIC SURGERY | Age: 58
End: 2023-10-17
Payer: COMMERCIAL

## 2023-10-17 VITALS — BODY MASS INDEX: 27.77 KG/M2 | RESPIRATION RATE: 16 BRPM | HEIGHT: 72 IN | WEIGHT: 205 LBS

## 2023-10-17 DIAGNOSIS — S43.431A SUPERIOR GLENOID LABRUM LESION OF RIGHT SHOULDER, INITIAL ENCOUNTER: Primary | ICD-10-CM

## 2023-10-17 DIAGNOSIS — M75.81 TENDINITIS OF RIGHT ROTATOR CUFF: ICD-10-CM

## 2023-10-17 PROCEDURE — 3017F COLORECTAL CA SCREEN DOC REV: CPT | Performed by: ORTHOPAEDIC SURGERY

## 2023-10-17 PROCEDURE — 99213 OFFICE O/P EST LOW 20 MIN: CPT | Performed by: ORTHOPAEDIC SURGERY

## 2023-10-17 PROCEDURE — G8484 FLU IMMUNIZE NO ADMIN: HCPCS | Performed by: ORTHOPAEDIC SURGERY

## 2023-10-17 PROCEDURE — G8419 CALC BMI OUT NRM PARAM NOF/U: HCPCS | Performed by: ORTHOPAEDIC SURGERY

## 2023-10-17 PROCEDURE — G8427 DOCREV CUR MEDS BY ELIG CLIN: HCPCS | Performed by: ORTHOPAEDIC SURGERY

## 2023-10-17 PROCEDURE — 1036F TOBACCO NON-USER: CPT | Performed by: ORTHOPAEDIC SURGERY

## 2023-10-25 ENCOUNTER — HOSPITAL ENCOUNTER (OUTPATIENT)
Dept: PHYSICAL THERAPY | Age: 58
Setting detail: THERAPIES SERIES
Discharge: HOME | End: 2023-10-25
Payer: COMMERCIAL

## 2023-10-25 PROCEDURE — 97140 MANUAL THERAPY 1/> REGIONS: CPT

## 2023-10-25 PROCEDURE — 97530 THERAPEUTIC ACTIVITIES: CPT

## 2023-10-25 PROCEDURE — 97110 THERAPEUTIC EXERCISES: CPT

## 2023-10-25 NOTE — PLAN OF CARE
[address]  Phone: [phone]   Fax: [phone]  Physical Therapy Re-Certification Plan of Care    Dear Valerie Tilley MD  ,    We had the pleasure of treating the following patient for physical therapy services at [address] [place] [address]. A summary of our findings can be found in the updated assessment below. This includes our plan of care. If you have any questions or concerns regarding these findings, please do not hesitate to contact me at the office phone number checked above. Thank you for the referral.     Physician Signature:________________________________Date:__________________  By signing above (or electronic signature), therapist's plan is approved by physician      Functional Outcome: UEFI: raw score = 60; dysfunction = 25%           10/25/23  José Smith 1965 continues to present with functional deficits in strength symmetry and muscle activation  limiting ability with reaching overhead . During therapy this date, patient required verbal cueing, tactile cueing, muscle facilitation, and manual interventions for exercise progression, improving proper muscle recruitment and activation/motor control patterns, modulating pain, reduce/eliminate soft tissue swelling/inflammation/restriction, and improving postural awareness. Patient will continue to benefit from ongoing evaluation and advanced clinical decision from a Physical Therapist to improve pain control, muscle strength, neuromuscular control, and proper body mechanics to safely return to PLOF without symptoms or restrictions.     Overall Response to Treatment:   []Patient is responding well to treatment and improvement is noted with regards to goals   []Patient should continue to improve in reasonable time if they continue HEP   []Patient has plateaued and is no longer responding to skilled PT intervention    []Patient is getting worse and would benefit from

## 2023-11-01 ENCOUNTER — HOSPITAL ENCOUNTER (OUTPATIENT)
Dept: PHYSICAL THERAPY | Age: 58
Setting detail: THERAPIES SERIES
Discharge: HOME | End: 2023-11-01
Payer: COMMERCIAL

## 2023-11-01 PROCEDURE — 97110 THERAPEUTIC EXERCISES: CPT

## 2023-11-01 PROCEDURE — 97112 NEUROMUSCULAR REEDUCATION: CPT

## 2023-11-01 NOTE — PLAN OF CARE
complexities/Impairments listed. [] Progression has been slowed due to co-morbidities. [] Plan just implemented, too soon to assess goals progression <30days   [] Goals require adjustment due to lack of progress  [] Patient is not progressing as expected and requires additional follow up with physician  [x] Other: pt is progressing but slowed d/t decreased attendance to therapy appts    Persisting Functional Limitations/Impairments:  []Sitting []Standing   []Transfers  []Sleeping   [x]Reaching [x]Lifting   []ADLs [x]Housework  []Driving [x]Job related tasks  [x]Sports/Recreation []Other:    ASSESSMENT: Tolerated exercises well. Only slight increase in discomfort with 90/90 ER and with abduction but no increase in pain. Continued verbal and tactile cueing to remain scap in neutral, decrease rounded shoulders and improve upright posture when performing exercises. Will continue to benefit from skilled therapy to decrease pain and improve functional mobility. Treatment/Activity Tolerance:  [x] Pt able to complete treatment [] Patient limited by fatique  [] Patient limited by pain  [] Patient limited by other medical complications  [] Other:     Prognosis:  [x] Good [] Fair  [] Poor    Patient Requires Follow-up: [x] Yes  [] No      Plan for next treatment session:     PLAN: See eval. PT 1-2x / week for 6 weeks. [x] Continue per plan of care [] Alter current plan (see comments)  [] Plan of care initiated [] Hold pending MD visit [] Discharge    Electronically signed by: Justin Lennon, PT, DPT      Note: If patient does not return for scheduled/ recommended follow up visits, this note will serve as a discharge from care along with most recent update on progress.

## 2023-11-07 DIAGNOSIS — E78.2 MIXED HYPERLIPIDEMIA: ICD-10-CM

## 2023-11-07 DIAGNOSIS — I25.10 CORONARY ARTERY DISEASE WITHOUT ANGINA PECTORIS, UNSPECIFIED VESSEL OR LESION TYPE, UNSPECIFIED WHETHER NATIVE OR TRANSPLANTED HEART: Primary | ICD-10-CM

## 2023-11-08 ENCOUNTER — HOSPITAL ENCOUNTER (OUTPATIENT)
Dept: PHYSICAL THERAPY | Age: 58
Setting detail: THERAPIES SERIES
Discharge: HOME | End: 2023-11-08
Payer: COMMERCIAL

## 2023-11-08 PROCEDURE — 97112 NEUROMUSCULAR REEDUCATION: CPT

## 2023-11-08 PROCEDURE — 97140 MANUAL THERAPY 1/> REGIONS: CPT

## 2023-11-08 PROCEDURE — 97110 THERAPEUTIC EXERCISES: CPT

## 2023-11-08 NOTE — FLOWSHEET NOTE
[address]  Phone: [phone]   Fax: [phone]    Physical Therapy Daily Treatment Note    Date:  2023     Patient Name:  Vonnie Hernandez    :  1965  MRN: [id number]  Medical Diagnosis:  Tendinitis of right rotator cuff [M75.81]  Superior glenoid labrum lesion of right shoulder, initial encounter [S43.873A]  Treatment Diagnosis:  forward head, dec RUE flexion, abduction, IR/ER strength, B UT muscle adhesions  Insurance/Certification information:  PT Insurance Information: [facility] ($30 copay, no auth, no ded)  Physician Information:  Nae Valiente MD    Plan of care signed (Y/N): [x]  Yes []  No     Date of Patient follow up with Physician:      Progress Report: []  Yes  [x]  No     Date Range for reporting period:  Beginnin2023  POC: 10/25/23  Ending:     Progress report due (10 Rx/or 30 days whichever is less): visit #6 or     Recertification due (POC duration/ or 90 days whichever is less): visit #12 or 23    Visit # Insurance Allowable Auth required? Date Range    [facility] ($30 copay, no auth, no ded) []  Yes  []  No          Latex Allergy:  [x]NO      []YES  Preferred Language for Healthcare:   [x]English       []other:    Functional Scale:       Date assessed:  UEFI: raw score = 60; dysfunction = 25%           10/25/23    Pain level:  2-3/10 posterior R shoulder     SUBJECTIVE: Doing okay. States he is doing okay, maybe a little better but hard to tell. No discomfort right now. HEP is going well.     OBJECTIVE:   10/25:    PROM AROM     L R L R   Cervical Side-bend     35 deg 35 deg   Shoulder Flexion      [facility] WFL *end range   Shoulder Abduction      [facility] WFL * end range   Shoulder External Rotation      Shoulder Internal Rotation      T12 L4/5     Strength (0-5) Left Right    Shoulder Flex 4- 4   Shoulder Abd (C5) 4 3+ *   Shoulder ER 5 4- *   Shoulder IR 5 4- *       : Slight B winged scapula  See eval  Observation:

## 2023-11-10 DIAGNOSIS — I25.10 CORONARY ARTERY DISEASE INVOLVING NATIVE CORONARY ARTERY OF NATIVE HEART WITHOUT ANGINA PECTORIS: ICD-10-CM

## 2023-11-10 DIAGNOSIS — Z79.899 LONG-TERM CURRENT USE OF PROTON PUMP INHIBITOR THERAPY: ICD-10-CM

## 2023-11-10 DIAGNOSIS — Z13.1 DIABETES MELLITUS SCREENING: ICD-10-CM

## 2023-11-10 DIAGNOSIS — E78.00 PURE HYPERCHOLESTEROLEMIA: ICD-10-CM

## 2023-11-10 DIAGNOSIS — E55.9 VITAMIN D DEFICIENCY: ICD-10-CM

## 2023-11-10 DIAGNOSIS — K21.9 GASTROESOPHAGEAL REFLUX DISEASE, UNSPECIFIED WHETHER ESOPHAGITIS PRESENT: ICD-10-CM

## 2023-11-10 DIAGNOSIS — E78.2 MIXED HYPERLIPIDEMIA: Primary | ICD-10-CM

## 2023-11-15 ENCOUNTER — HOSPITAL ENCOUNTER (OUTPATIENT)
Dept: PHYSICAL THERAPY | Age: 58
Setting detail: THERAPIES SERIES
Discharge: HOME | End: 2023-11-15
Payer: COMMERCIAL

## 2023-11-15 PROCEDURE — 97110 THERAPEUTIC EXERCISES: CPT

## 2023-11-15 PROCEDURE — 97140 MANUAL THERAPY 1/> REGIONS: CPT

## 2023-11-15 PROCEDURE — 97112 NEUROMUSCULAR REEDUCATION: CPT

## 2023-11-15 PROCEDURE — 97530 THERAPEUTIC ACTIVITIES: CPT

## 2023-11-15 NOTE — FLOWSHEET NOTE
proprioception of scapular, scapulothoracic and UE control with self care, reaching, carrying, lifting, house/yardwork, driving/computer work      Manual Treatments:  PROM / STM / Oscillations-Mobs:  G-I, II, III, IV (PA's, Inf., Post.)  [] (93541) Provided manual therapy to mobilize soft tissue/joints of cervical/CT, scapular GHJ and UE for the purpose of modulating pain, promoting relaxation,  increasing ROM, reducing/eliminating soft tissue swelling/inflammation/restriction, improving soft tissue extensibility and allowing for proper ROM for normal function with self care, reaching, carrying, lifting, house/yardwork, driving/computer work      Charges:  Timed Code Treatment Minutes: 55   Total Treatment Minutes: 55       [] EVAL - LOW (67792)   [] EVAL - MOD (64999)  [] EVAL - HIGH (06875)  [] RE-EVAL (66650)  [x] MS(44214) x   1    [] Ionto  [x] NMR (13025) x 1      [] Vaso  [x] Manual (89271) x    1   [] Ultrasound  [x] TA x   1     [] Mech Traction (27996)  [] Aquatic Therapy x     [] ES (un) (96708):   [] Home Management Training x  [] ES(attended) (28951)   [] Dry Needling 1-2 muscles (14871):  [] Dry Needling 3+ muscles (385666  [] Group:      [] Other:                    GOALS:  Patient stated goal: weight lifting, mountain biking, learn how to prevent reinjury  [x] Progressing: [] Met: [] Not Met: [] Adjusted     Therapist goals for Patient:   Short Term Goals: To be achieved in: 2 weeks  1. Independent in HEP and progression per patient tolerance, in order to prevent re-injury. [x] Progressing: [] Met: [] Not Met: [] Adjusted  2. Patient will have a decrease in pain to facilitate improvement in movement, function, and ADLs as indicated by Functional Deficits. [x] Progressing: [] Met: [] Not Met: [] Adjusted     Long Term Goals: To be achieved in: 6 weeks  1. Pt will improve UEFI score by 10 points to reduce disability and progress towards PLOF.  Npv dt time  [x] Progressing: [] Met: [] Not Met: []

## 2023-11-22 ENCOUNTER — HOSPITAL ENCOUNTER (OUTPATIENT)
Dept: PHYSICAL THERAPY | Age: 58
Setting detail: THERAPIES SERIES
Discharge: HOME | End: 2023-11-22
Payer: COMMERCIAL

## 2023-11-22 PROCEDURE — 97110 THERAPEUTIC EXERCISES: CPT

## 2023-11-22 PROCEDURE — 97530 THERAPEUTIC ACTIVITIES: CPT

## 2023-11-22 NOTE — FLOWSHEET NOTE
[address]  Phone: [phone]   Fax: [phone]    Physical Therapy Daily Treatment Note    Date:  2023     Patient Name:  Loyd Randhawa    :  1965  MRN: [id number]  Medical Diagnosis:  Tendinitis of right rotator cuff [M75.81]  Superior glenoid labrum lesion of right shoulder, initial encounter [S43.976A]  Treatment Diagnosis:  forward head, dec RUE flexion, abduction, IR/ER strength, B UT muscle adhesions  Insurance/Certification information:  PT Insurance Information: [facility] ($30 copay, no auth, no ded)  Physician Information:  Jamia Jackson MD    Plan of care signed (Y/N): [x]  Yes []  No     Date of Patient follow up with Physician:      Progress Report: [x]  Yes  []  No     Date Range for reporting period:  Beginnin2023  POC: 10/25/23  PN: 23  Ending:     Progress report due (10 Rx/or 30 days whichever is less): visit #10 or 15/93/05    Recertification due (POC duration/ or 90 days whichever is less): visit #12 or 23    Visit # Insurance Allowable Auth required? Date Range    [facility] ($30 copay, no auth, no ded) []  Yes  []  No          Latex Allergy:  [x]NO      []YES  Preferred Language for Healthcare:   [x]English       []other:    Functional Scale:       Date assessed:  UEFI: raw score = 60; dysfunction = 25%           10/25/23  UEFI: raw score = 54; dysfunction = 32.5% 23 - feels like he is about the same as last month     Pain level:  3/10 posterior R shoulder     SUBJECTIVE: States that his shoulder is still feeling about the same. Continues to report some movements cause increase in pain and continues to avoid doing them. Feels like he hasn't improved for a little bit. Feels like the exercises are hitting the muscles where he has weakness but continues to feel his discomfort. Saw Dr. Brandee Hunt about a month ago.  Continues to have trouble sleeping on R side and wakes up because of pain if he lays on it, and then

## 2023-11-29 ENCOUNTER — APPOINTMENT (OUTPATIENT)
Dept: PHYSICAL THERAPY | Age: 58
End: 2023-11-29
Payer: COMMERCIAL

## 2023-12-06 ENCOUNTER — HOSPITAL ENCOUNTER (OUTPATIENT)
Dept: PHYSICAL THERAPY | Age: 58
Setting detail: THERAPIES SERIES
Discharge: HOME | End: 2023-12-06
Payer: COMMERCIAL

## 2023-12-06 PROCEDURE — 97140 MANUAL THERAPY 1/> REGIONS: CPT

## 2023-12-06 PROCEDURE — 97110 THERAPEUTIC EXERCISES: CPT

## 2023-12-06 NOTE — FLOWSHEET NOTE
[address]  Phone: [phone]   Fax: [phone]    Physical Therapy Daily Treatment Note    Date:  2023     Patient Name:  María Fontaine    :  1965  MRN: [id number]  Medical Diagnosis:  Tendinitis of right rotator cuff [M75.81]  Superior glenoid labrum lesion of right shoulder, initial encounter [S43.834A]  Treatment Diagnosis:  forward head, dec RUE flexion, abduction, IR/ER strength, B UT muscle adhesions  Insurance/Certification information:  PT Insurance Information: [facility] ($30 copay, no auth, no ded)  Physician Information:  Sunshine Ortez MD    Plan of care signed (Y/N): [x]  Yes []  No     Date of Patient follow up with Physician:      Progress Report: [x]  Yes  []  No     Date Range for reporting period:  Beginnin2023  POC: 10/25/23  PN: 23  Ending:     Progress report due (10 Rx/or 30 days whichever is less): visit #10 or     Recertification due (POC duration/ or 90 days whichever is less): visit #12 or 23    Visit # Insurance Allowable Auth required? Date Range    [facility] ($30 copay, no auth, no ded) []  Yes  []  No          Latex Allergy:  [x]NO      []YES  Preferred Language for Healthcare:   [x]English       []other:    Functional Scale:       Date assessed:  UEFI: raw score = 60; dysfunction = 25%           10/25/23  UEFI: raw score = 54; dysfunction = 32.5% 23 - feels like he is about the same as last month     Pain level:  3/10 posterior R shoulder     SUBJECTIVE: States he is feeling a little better. Has to avoid less movements d/t pain.        OBJECTIVE:   23:    PROM AROM     L R L R   Cervical Side-bend     45 deg 45 deg   Shoulder Flexion       degrees - pain at end range - slight   Shoulder Abduction       degrees, pain at end range   Shoulder External Rotation      114 deg 110 degrees, no pain, no shoulder elevation from table   Shoulder Internal Rotation      69

## 2023-12-10 PROBLEM — Z13.1 DIABETES MELLITUS SCREENING: Status: RESOLVED | Noted: 2023-04-06 | Resolved: 2023-12-10

## 2023-12-13 ENCOUNTER — HOSPITAL ENCOUNTER (OUTPATIENT)
Dept: PHYSICAL THERAPY | Age: 58
Setting detail: THERAPIES SERIES
Discharge: HOME | End: 2023-12-13
Payer: COMMERCIAL

## 2023-12-13 PROCEDURE — 97140 MANUAL THERAPY 1/> REGIONS: CPT

## 2023-12-13 PROCEDURE — 97110 THERAPEUTIC EXERCISES: CPT

## 2023-12-13 NOTE — FLOWSHEET NOTE
[address] [facility]  Phone: [phone]   Fax: [phone]    Physical Therapy Daily Treatment Note    Date:  2023     Patient Name:  Holden Meyer    :  1965  MRN: [id number]  Medical Diagnosis:  Tendinitis of right rotator cuff [M75.81]  Superior glenoid labrum lesion of right shoulder, initial encounter [S43.316A]  Treatment Diagnosis:  forward head, dec RUE flexion, abduction, IR/ER strength, B UT muscle adhesions  Insurance/Certification information:  PT Insurance Information: [facility] ($30 copay, no auth, no ded)  Physician Information:  Lamberto Harley MD    Plan of care signed (Y/N): [x]  Yes []  No     Date of Patient follow up with Physician:      Progress Report: [x]  Yes  []  No     Date Range for reporting period:  Beginnin2023  POC: 10/25/23  PN: 23  Ending:     Progress report due (10 Rx/or 30 days whichever is less): visit #10 or 26    Recertification due (POC duration/ or 90 days whichever is less): visit #12 or 23    Visit # Insurance Allowable Auth required? Date Range    [facility] ($30 copay, no auth, no ded) []  Yes  []  No          Latex Allergy:  [x]NO      []YES  Preferred Language for Healthcare:   [x]English       []other:    Functional Scale:       Date assessed:  UEFI: raw score = 60; dysfunction = 25%           10/25/23  UEFI: raw score = 54; dysfunction = 32.5% 23 - feels like he is about the same as last month     Pain level:  3/10 posterior R shoulder     SUBJECTIVE: States he is doing about the same. States his shoulder is feeling a little achy this morning. Continues to ice his shoulder sporadically- has been icing everyday last week. Finds it hard to find time to ice. States he has some days where he gets some N/t in thumb, index and middle finger 2 weeks ago. States it was persistent for a day or two.       OBJECTIVE:   23:    PROM AROM     L R L R   Cervical Side-bend     45 deg 45 deg

## 2023-12-27 ENCOUNTER — HOSPITAL ENCOUNTER (OUTPATIENT)
Dept: PHYSICAL THERAPY | Age: 58
Setting detail: THERAPIES SERIES
End: 2023-12-27
Payer: COMMERCIAL

## 2024-01-03 ENCOUNTER — HOSPITAL ENCOUNTER (OUTPATIENT)
Dept: PHYSICAL THERAPY | Age: 59
Setting detail: THERAPIES SERIES
Discharge: HOME | End: 2024-01-03
Payer: COMMERCIAL

## 2024-01-03 PROCEDURE — 97140 MANUAL THERAPY 1/> REGIONS: CPT

## 2024-01-03 PROCEDURE — 97110 THERAPEUTIC EXERCISES: CPT

## 2024-01-03 PROCEDURE — 97112 NEUROMUSCULAR REEDUCATION: CPT

## 2024-01-03 NOTE — FLOWSHEET NOTE
[facility] - Outpatient Physical Therapy, [place]  Phone: [phone]   Fax: [phone]  Physical Therapy Daily Treatment Note    Date:  2024     Patient Name:  Murphy Mcdermott    :  1965  MRN: [id number]  Medical Diagnosis:  Tendinitis of right rotator cuff [M75.81]  Superior glenoid labrum lesion of right shoulder, initial encounter [S49.644A]  Treatment Diagnosis:  forward head, dec RUE flexion, abduction, IR/ER strength, B UT muscle adhesions  Insurance/Certification information:  PT Insurance Information: [facility] ($30 copay, no auth, no ded)  Physician Information:  Malik Potter MD    Plan of care signed (Y/N): [x]  Yes []  No     Date of Patient follow up with Physician:      Progress Report: [x]  Yes  []  No     Date Range for reporting period:  Beginnin2023  POC: 10/25/23  PN: 23  POC: 23  Ending:     Progress report due (10 Rx/or 30 days whichever is less): visit #10 or 23    Visit # Insurance Allowable Auth required? Date Range   10/12  +0/ once current end [facility] ($30 copay, no auth, no ded) []  Yes  []  No          Latex Allergy:  [x]NO      []YES  Preferred Language for Healthcare:   [x]English       []other:    Functional Scale:       Date assessed:  UEFI: raw score = 60; dysfunction = 25%           10/25/23  UEFI: raw score = 54; dysfunction = 32.5% 23 - feels like he is about the same as last month     Pain level:  0/10 posterior R shoulder     SUBJECTIVE: States he is feeling a little better both sickness and with decreased pain this date. Is no longer wearing tape - took if off last week. Feels like the new exercises made a difference. States he has been doing a static hold on his rings at home and states he has no discomfort with it and feels like that has been helpful as well. States that he continues to have pain in anterior shoulder joint - has held off incline push ups for the last 2 weeks. Has gotten better at not sleeping on R side

## 2024-01-10 ENCOUNTER — HOSPITAL ENCOUNTER (OUTPATIENT)
Dept: PHYSICAL THERAPY | Age: 59
Setting detail: THERAPIES SERIES
Discharge: HOME | End: 2024-01-10
Payer: COMMERCIAL

## 2024-01-10 PROCEDURE — 97112 NEUROMUSCULAR REEDUCATION: CPT

## 2024-01-10 PROCEDURE — 97530 THERAPEUTIC ACTIVITIES: CPT

## 2024-01-10 PROCEDURE — 97110 THERAPEUTIC EXERCISES: CPT

## 2024-01-10 PROCEDURE — 97140 MANUAL THERAPY 1/> REGIONS: CPT

## 2024-01-10 NOTE — FLOWSHEET NOTE
Shoulder Flexion       degrees - pain at end range - slight   Shoulder Abduction       degrees, pain at end range   Shoulder External Rotation      114 deg 110 degrees, no pain, no shoulder elevation from table   Shoulder Internal Rotation      69 degrees  no pain, PT provided OP at shoulder to decrease  69 degree no pain, PT provided OP at shoulder to decrease shoulder lift   Pec tightness - 5 inches from table from B AC joint    Strength (0-5) Left Right    Shoulder Flex 4 4 - pain    Shoulder Abd (C5) 5 4- * pain   Shoulder ER 5 4- *   Shoulder IR 5 4 *pain in elbow     10/25:    PROM AROM     L R L R   Cervical Side-bend     35 deg 35 deg   Shoulder Flexion      WFL WFL *end range   Shoulder Abduction      WFL WFL * end range   Shoulder External Rotation      Shoulder Internal Rotation      T12 L4/5     Strength (0-5) Left Right    Shoulder Flex 4- 4   Shoulder Abd (C5) 4 3+ *   Shoulder ER 5 4- *   Shoulder IR 5 4- *       9/19: Slight B winged scapula  See eval  Observation:   Test measurements:      RESTRICTIONS/PRECAUTIONS:   Relevant Medical History:  MRI 8/14/23:  1. Moderate tendinopathy of the subscapularis tendon, with severe confluent tendinopathy of the    superior insertional fibers. Adjacent mild tendinopathy of the intra-articular long head   biceps tendon.   2. Nondisplaced SLAP type 2A tear of the superior and anterosuperior glenoid labrum.   3. Mild AC joint arthrosis, with mesoacromion-type os acromiale, and mild inflammation of the   synchondrosis. No acute AC joint separation or injury.   4. Mild supraspinatus and infraspinatus tendinopathy. No full-thickness or retracted rotator   cuff tear.   5. Myoedema of inferior bundle of teres minor muscle probably neurogenic    Exercises/Interventions:   Therapeutic Exercise (45263) Resistance / level Sets / Seconds  Reps Notes/Cues   UBE 5 2F/R  12/13      TM     HEP review      CB stretch x      Pulleys  Flex   abd     Bicep

## 2024-01-24 ENCOUNTER — HOSPITAL ENCOUNTER (OUTPATIENT)
Dept: PHYSICAL THERAPY | Age: 59
Setting detail: THERAPIES SERIES
Discharge: HOME | End: 2024-01-24
Payer: COMMERCIAL

## 2024-01-24 PROCEDURE — 97112 NEUROMUSCULAR REEDUCATION: CPT

## 2024-01-24 PROCEDURE — 97110 THERAPEUTIC EXERCISES: CPT

## 2024-01-24 PROCEDURE — 97530 THERAPEUTIC ACTIVITIES: CPT

## 2024-01-24 NOTE — PLAN OF CARE
[facility] - Outpatient Physical Therapy, [place]  Phone: [phone]   Fax: [phone]  Physical Therapy Re-Certification Plan of Care    Dear Malik Potter MD  ,    We had the pleasure of treating the following patient for physical therapy services at [facility]. A summary of our findings can be found in the updated assessment below.  This includes our plan of care.  If you have any questions or concerns regarding these findings, please do not hesitate to contact me at the office phone number checked above.  Thank you for the referral.     Physician Signature:________________________________Date:__________________  By signing above (or electronic signature), therapist's plan is approved by physician      Functional Outcome: UEFI: raw score = 66   ; dysfunction = 17.5%  1/24/24    Murphy Mcdermott 1965 continues to present with functional deficits in strength symmetry  limiting ability with heavy home activity, yardwork, and return to gym based activities  .  During therapy this date, patient required progression of exercises and program for exercise progression, modulating pain, and improving postural awareness. Patient will continue to benefit from ongoing evaluation and advanced clinical decision from a Physical Therapist to improve muscle strength, neuromuscular control, and proper body mechanics to safely return to PLOF without symptoms or restrictions.    Overall Response to Treatment:   [x]Patient is responding well to treatment and improvement is noted with regards to goals   []Patient should continue to improve in reasonable time if they continue HEP   []Patient has plateaued and is no longer responding to skilled PT intervention    []Patient is getting worse and would benefit from return to referring MD   []Patient unable to adhere to initial POC   [x]Other: Murphy has made improvements in his strength and pain since starting therapy. Also demonstrated

## 2024-02-07 ENCOUNTER — HOSPITAL ENCOUNTER (OUTPATIENT)
Dept: PHYSICAL THERAPY | Age: 59
Setting detail: THERAPIES SERIES
End: 2024-02-07
Payer: COMMERCIAL

## 2024-02-14 ENCOUNTER — HOSPITAL ENCOUNTER (OUTPATIENT)
Dept: PHYSICAL THERAPY | Age: 59
Setting detail: THERAPIES SERIES
Discharge: HOME | End: 2024-02-14
Payer: COMMERCIAL

## 2024-02-14 PROCEDURE — 97112 NEUROMUSCULAR REEDUCATION: CPT

## 2024-02-14 PROCEDURE — 97110 THERAPEUTIC EXERCISES: CPT

## 2024-02-14 NOTE — FLOWSHEET NOTE
[facility] - Outpatient Physical Therapy, [place]  Phone: [phone]   Fax: [phone]    Physical Therapy Daily Treatment Note    Date:  2024     Patient Name:  Murphy Mcdermott    :  1965  MRN: [id number]  Medical Diagnosis:  Tendinitis of right rotator cuff [M75.81]  Superior glenoid labrum lesion of right shoulder, initial encounter [S47.331A]  Treatment Diagnosis:  forward head, dec RUE flexion, abduction, IR/ER strength, B UT muscle adhesions  Insurance/Certification information:  PT Insurance Information: [facility] ($30 copay, no auth, no ded)  Physician Information:  Malik Potter MD    Plan of care signed (Y/N): [x]  Yes []  No     Date of Patient follow up with Physician:      Progress Report: [x]  Yes  []  No     Date Range for reporting period:  Beginnin2023  POC: 10/25/23  PN: 23  POC: 23  POC: 24  Ending:     Progress report due (10 Rx/or 30 days whichever is less): visit #10 or 23    Visit # Insurance Allowable Auth required? Date Range     + [employer] ($30 copay, no auth, no ded) []  Yes  []  No          Latex Allergy:  [x]NO      []YES  Preferred Language for Healthcare:   [x]English       []other:    Functional Scale:       Date assessed:  UEFI: raw score = 60; dysfunction = 25%           10/25/23  UEFI: raw score = 54; dysfunction = 32.5% 23 - feels like he is about the same as last month   UEFI: raw score = 66   ; dysfunction = 17.5%  24  Pain level:  0/10 posterior R shoulder     SUBJECTIVE: Pain with movements including thumb down with flexion. Wakes up with stiffness in his arm and eases up.     Doing well. States that he is not having pain in the front of his biceps and in the back. Has been using his rings and doing push up and vertical hands and pertubation's without discomfort. Continues to have discomfort when he first wakes up in both shoulders but states it goes away.  Wants to be able to return to gym like

## 2024-02-21 ENCOUNTER — APPOINTMENT (OUTPATIENT)
Dept: PHYSICAL THERAPY | Age: 59
End: 2024-02-21
Payer: COMMERCIAL

## 2024-02-28 ENCOUNTER — HOSPITAL ENCOUNTER (OUTPATIENT)
Dept: PHYSICAL THERAPY | Age: 59
Setting detail: THERAPIES SERIES
Discharge: HOME | End: 2024-02-28
Payer: COMMERCIAL

## 2024-02-28 PROCEDURE — 97110 THERAPEUTIC EXERCISES: CPT

## 2024-02-28 PROCEDURE — 97112 NEUROMUSCULAR REEDUCATION: CPT

## 2024-02-28 PROCEDURE — 97530 THERAPEUTIC ACTIVITIES: CPT

## 2024-02-28 NOTE — FLOWSHEET NOTE
improvement, issued outcome measure and reviewed new score with pt as compared to eval     10/25   Edu with model used for SLAP tears, explain joint mobilizations, capsule tightness. All patient questions answered   11/15   SA punch with TB and slight thoracic rotation 11/22: inc in pain on R   Rhythmic stab with ball on wall        Reassessed goals, discussed progress made and areas remaining for improvement, issued outcome measure and reviewed new score with pt as compared to eval. All patient questions answered     11/22   Supine horizontal abd stretch into IR       Full can scaption with DB 12/20   Reassessed goals, discussed progress made and areas remaining for improvement, issued outcome measure and reviewed new score with pt as compared to eval     12/20   Education about bicep tendon and palpation, edu about exercise modifications with rings at home. Edu about ionto Answered all pt questions   1/10   Reassessed goals, discussed progress made and areas remaining for improvement, issued outcome measure and reviewed new score with pt as compared to eval  UEFI   1/24/24   Education on science behind PNF, importance of Closed chain exercises for stability, and graded intensity progression moving forward with return to dips, sled pushes etc, bear crawl  20'     Neuromuscular Re-ed (56714)       SB IYT 9/19: inc in fatigue,  added to HEP   Bent over rows       Ball on wall  CW and CCW, fwd and lat 90 and 120 deg 10/25   Rows  Ext  D2  IR/ER  Abd  90/90 ER  11/1        Slight inc in discomfort on R   Semi [place] Blaze pod taps in plank       UE on miley disc with blaze pod  taps       Body blade  0 deg abd, IR/ER, elbow bent neutral and palm down  90/90 neutral and palm down  90 dec scap abd 11/15   Dynamic hug with theraband 11/8   Quadruped or bear hold  D2 flexion with weight       Shoulder taps plank  Serratus punch 11/15:  11/22: added to HEP   Prone row  Prone ext  Prone T 12/20: added to HEP   Body blade 0

## 2024-03-28 ENCOUNTER — HOSPITAL ENCOUNTER (OUTPATIENT)
Age: 59
Discharge: HOME | End: 2024-03-28
Payer: COMMERCIAL

## 2024-03-28 DIAGNOSIS — Z13.1 DIABETES MELLITUS SCREENING: ICD-10-CM

## 2024-03-28 DIAGNOSIS — Z12.5 PROSTATE CANCER SCREENING: ICD-10-CM

## 2024-03-28 DIAGNOSIS — I25.10 CORONARY ARTERY DISEASE INVOLVING NATIVE CORONARY ARTERY OF NATIVE HEART WITHOUT ANGINA PECTORIS: ICD-10-CM

## 2024-03-28 DIAGNOSIS — E55.9 VITAMIN D DEFICIENCY: ICD-10-CM

## 2024-03-28 DIAGNOSIS — R17 SERUM TOTAL BILIRUBIN ELEVATED: Primary | ICD-10-CM

## 2024-03-28 DIAGNOSIS — Z79.899 LONG-TERM CURRENT USE OF PROTON PUMP INHIBITOR THERAPY: ICD-10-CM

## 2024-03-28 DIAGNOSIS — I25.10 CORONARY ARTERY DISEASE WITHOUT ANGINA PECTORIS, UNSPECIFIED VESSEL OR LESION TYPE, UNSPECIFIED WHETHER NATIVE OR TRANSPLANTED HEART: ICD-10-CM

## 2024-03-28 DIAGNOSIS — K21.9 GASTROESOPHAGEAL REFLUX DISEASE, UNSPECIFIED WHETHER ESOPHAGITIS PRESENT: ICD-10-CM

## 2024-03-28 DIAGNOSIS — E78.2 MIXED HYPERLIPIDEMIA: ICD-10-CM

## 2024-03-28 LAB
25(OH)D3 SERPL-MCNC: 47 NG/ML
ALBUMIN SERPL-MCNC: 4.3 G/DL (ref 3.4–5)
ALBUMIN/GLOB SERPL: 2 {RATIO} (ref 1.1–2.2)
ALP SERPL-CCNC: 72 U/L (ref 40–129)
ALT SERPL-CCNC: 25 U/L (ref 10–40)
ANION GAP SERPL CALCULATED.3IONS-SCNC: 9 MMOL/L (ref 3–16)
AST SERPL-CCNC: 27 U/L (ref 15–37)
BASOPHILS # BLD: 0 K/UL (ref 0–0.2)
BASOPHILS NFR BLD: 0.7 %
BILIRUB SERPL-MCNC: 1.3 MG/DL (ref 0–1)
BUN SERPL-MCNC: 15 MG/DL (ref 7–20)
CALCIUM SERPL-MCNC: 9.2 MG/DL (ref 8.3–10.6)
CHLORIDE SERPL-SCNC: 105 MMOL/L (ref 99–110)
CHOLEST SERPL-MCNC: 100 MG/DL (ref 0–199)
CK SERPL-CCNC: 185 U/L (ref 39–308)
CO2 SERPL-SCNC: 28 MMOL/L (ref 21–32)
CREAT SERPL-MCNC: 0.8 MG/DL (ref 0.9–1.3)
CRP SERPL-MCNC: <3 MG/L (ref 0–5.1)
DEPRECATED RDW RBC AUTO: 13.3 % (ref 12.4–15.4)
EOSINOPHIL # BLD: 0.1 K/UL (ref 0–0.6)
EOSINOPHIL NFR BLD: 1 %
EST. AVERAGE GLUCOSE BLD GHB EST-MCNC: 108.3 MG/DL
GFR SERPLBLD CREATININE-BSD FMLA CKD-EPI: >90 ML/MIN/{1.73_M2}
GLUCOSE SERPL-MCNC: 101 MG/DL (ref 70–99)
HBA1C MFR BLD: 5.4 %
HCT VFR BLD AUTO: 40.1 % (ref 40.5–52.5)
HDLC SERPL-MCNC: 45 MG/DL (ref 40–60)
HGB BLD-MCNC: 14.3 G/DL (ref 13.5–17.5)
LDLC SERPL CALC-MCNC: 47 MG/DL
LYMPHOCYTES # BLD: 1.9 K/UL (ref 1–5.1)
LYMPHOCYTES NFR BLD: 32 %
MAGNESIUM SERPL-MCNC: 2 MG/DL (ref 1.8–2.4)
MCH RBC QN AUTO: 28.4 PG (ref 26–34)
MCHC RBC AUTO-ENTMCNC: 35.6 G/DL (ref 31–36)
MCV RBC AUTO: 79.9 FL (ref 80–100)
MONOCYTES # BLD: 0.5 K/UL (ref 0–1.3)
MONOCYTES NFR BLD: 8.4 %
NEUTROPHILS # BLD: 3.4 K/UL (ref 1.7–7.7)
NEUTROPHILS NFR BLD: 57.9 %
PLATELET # BLD AUTO: 191 K/UL (ref 135–450)
PMV BLD AUTO: 8.7 FL (ref 5–10.5)
POTASSIUM SERPL-SCNC: 4.6 MMOL/L (ref 3.5–5.1)
PROT SERPL-MCNC: 6.4 G/DL (ref 6.4–8.2)
PSA SERPL DL<=0.01 NG/ML-MCNC: 1.22 NG/ML (ref 0–4)
RBC # BLD AUTO: 5.02 M/UL (ref 4.2–5.9)
SODIUM SERPL-SCNC: 142 MMOL/L (ref 136–145)
TRIGL SERPL-MCNC: 38 MG/DL (ref 0–150)
TSH SERPL DL<=0.005 MIU/L-ACNC: 3.84 UIU/ML (ref 0.27–4.2)
VLDLC SERPL CALC-MCNC: 8 MG/DL
WBC # BLD AUTO: 5.9 K/UL (ref 4–11)

## 2024-03-28 PROCEDURE — 82306 VITAMIN D 25 HYDROXY: CPT

## 2024-03-28 PROCEDURE — 83735 ASSAY OF MAGNESIUM: CPT

## 2024-03-28 PROCEDURE — 85025 COMPLETE CBC W/AUTO DIFF WBC: CPT

## 2024-03-28 PROCEDURE — 36415 COLL VENOUS BLD VENIPUNCTURE: CPT

## 2024-03-28 PROCEDURE — 84443 ASSAY THYROID STIM HORMONE: CPT

## 2024-03-28 PROCEDURE — 86140 C-REACTIVE PROTEIN: CPT

## 2024-03-28 PROCEDURE — 83704 LIPOPROTEIN BLD QUAN PART: CPT

## 2024-03-28 PROCEDURE — 80061 LIPID PANEL: CPT

## 2024-03-28 PROCEDURE — 84153 ASSAY OF PSA TOTAL: CPT

## 2024-03-28 PROCEDURE — 82550 ASSAY OF CK (CPK): CPT

## 2024-03-28 PROCEDURE — 83036 HEMOGLOBIN GLYCOSYLATED A1C: CPT

## 2024-03-28 PROCEDURE — 80053 COMPREHEN METABOLIC PANEL: CPT

## 2024-03-29 DIAGNOSIS — R53.1 WEAKNESS: Primary | ICD-10-CM

## 2024-03-29 NOTE — RESULT ENCOUNTER NOTE
Normal kidney blood test.  The bilirubin was slightly elevated but all the other liver tests were normal.  Go to the lab for a direct and indirect bilirubin level nonfasting.  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  Also call [facility] scheduling at [phone] to arrange for right upper quadrant ultrasound to check the bile ducts.    There was no anemia and normal white blood cell count.  The A1c did not show anemia.  The cholesterol is very good.  The vitamin D level is normal.  Normal magnesium level.    Normal prostate blood.

## 2024-03-31 LAB
CHOLEST SERPL-MCNC: 107 MG/DL
HDL SERPL QN: 8.9 NM
HDL SERPL-SCNC: 28.9 UMOL/L
HDLC SERPL-MCNC: 44 MG/DL (ref 40–59)
HLD.LARGE SERPL-SCNC: 3.9 UMOL/L
LDL SERPL QN: 20.6 NM
LDL SERPL-SCNC: 759 NMOL/L
LDL SMALL SERPL-SCNC: 496 NMOL/L
LDLC SERPL CALC-MCNC: 54 MG/DL
PATHOLOGY STUDY: ABNORMAL
TRIGL SERPL-MCNC: 43 MG/DL (ref 30–149)
VLDL LARGE SERPL-SCNC: <1.5 NMOL/L
VLDL SERPL QN: 48.6 NM

## 2024-04-03 ENCOUNTER — HOSPITAL ENCOUNTER (OUTPATIENT)
Dept: ULTRASOUND IMAGING | Age: 59
Discharge: HOME | End: 2024-04-03
Payer: COMMERCIAL

## 2024-04-03 ENCOUNTER — HOSPITAL ENCOUNTER (OUTPATIENT)
Age: 59
Discharge: HOME | End: 2024-04-03
Payer: COMMERCIAL

## 2024-04-03 DIAGNOSIS — R53.1 WEAKNESS: ICD-10-CM

## 2024-04-03 DIAGNOSIS — R17 SERUM TOTAL BILIRUBIN ELEVATED: ICD-10-CM

## 2024-04-03 DIAGNOSIS — K80.10 CALCULUS OF GALLBLADDER WITH CHRONIC CHOLECYSTITIS WITHOUT OBSTRUCTION: Primary | ICD-10-CM

## 2024-04-03 LAB
BILIRUB DIRECT SERPL-MCNC: <0.2 MG/DL (ref 0–0.3)
BILIRUB INDIRECT SERPL-MCNC: ABNORMAL MG/DL (ref 0–1)
BILIRUB SERPL-MCNC: 1.5 MG/DL (ref 0–1)
LIPASE SERPL-CCNC: 40 U/L (ref 13–60)

## 2024-04-03 PROCEDURE — 83690 ASSAY OF LIPASE: CPT

## 2024-04-03 PROCEDURE — 36415 COLL VENOUS BLD VENIPUNCTURE: CPT

## 2024-04-03 PROCEDURE — 76705 ECHO EXAM OF ABDOMEN: CPT

## 2024-04-03 PROCEDURE — 84403 ASSAY OF TOTAL TESTOSTERONE: CPT

## 2024-04-03 PROCEDURE — 82247 BILIRUBIN TOTAL: CPT

## 2024-04-03 PROCEDURE — 84270 ASSAY OF SEX HORMONE GLOBUL: CPT

## 2024-04-03 PROCEDURE — 82248 BILIRUBIN DIRECT: CPT

## 2024-04-05 LAB
SHBG SERPL-SCNC: 30 NMOL/L (ref 19–76)
TESTOST FREE SERPL-MCNC: 72.3 PG/ML (ref 47–244)
TESTOST SERPL-MCNC: 343 NG/DL (ref 193–740)

## 2024-04-06 NOTE — RESULT ENCOUNTER NOTE
The direct bilirubin level is normal which means no obstruction.  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  People with this condition are just slow to conjugated bilirubin but they do conjugated normally, it just takes longer.  The testosterone level is normal.  The pancreas test is normal.

## 2024-04-11 ENCOUNTER — OFFICE VISIT (OUTPATIENT)
Dept: SURGERY | Age: 59
End: 2024-04-11
Payer: COMMERCIAL

## 2024-04-11 VITALS — WEIGHT: 200 LBS | BODY MASS INDEX: 27.12 KG/M2

## 2024-04-11 DIAGNOSIS — R11.0 NAUSEA: Primary | ICD-10-CM

## 2024-04-11 DIAGNOSIS — E80.6 HYPERBILIRUBINEMIA: ICD-10-CM

## 2024-04-11 PROCEDURE — 3017F COLORECTAL CA SCREEN DOC REV: CPT | Performed by: SURGERY

## 2024-04-11 PROCEDURE — 99203 OFFICE O/P NEW LOW 30 MIN: CPT | Performed by: SURGERY

## 2024-04-11 PROCEDURE — G8427 DOCREV CUR MEDS BY ELIG CLIN: HCPCS | Performed by: SURGERY

## 2024-04-11 PROCEDURE — 1036F TOBACCO NON-USER: CPT | Performed by: SURGERY

## 2024-04-11 PROCEDURE — G8419 CALC BMI OUT NRM PARAM NOF/U: HCPCS | Performed by: SURGERY

## 2024-04-11 NOTE — PROGRESS NOTES
New Patient Consult    [facility]  [facility]   Morgan Madden MD    [address], [address]  [place], [place] [address]  Phone: [phone]  Fax: [phone]    Murphy Mcdermott   YOB: 1965    Date of Visit:  4/11/2024    Kaylan Gates MD    HPI:     Gallbladder: Patient is 59 y.o. male seen at request of Kaylan Argueta MD.  Patient presents for evaluation of possible gallbladder problems. Problems were first noted 8 weeks ago. He has had nausea in the evenings 4-5 times.  This is mild and typically alleviated with eating food.  Denies any abdominal pain or change in his bowels.  No fevers or chills.  He denies any jaundice, dark colored urine or yt colored stool.  Never had any pain or other symptoms after eating.    Hx of open appendectomy at age 24.  Had previous EGD and colonoscopy, with last colonoscopy last year with Dr[facility].            No Known Allergies  Outpatient Medications Marked as Taking for the 4/11/24 encounter (Office Visit) with Morgan Madden MD   Medication Sig Dispense Refill    esomeprazole (NEXIUM) 20 MG delayed release capsule Take 1 capsule by mouth every morning (before breakfast) 90 capsule 3    cyclobenzaprine (FLEXERIL) 10 MG tablet Take 1 tablet by mouth 3 times daily as needed for Muscle spasms 30 tablet 0    ezetimibe (ZETIA) 10 MG tablet Take 1 tablet by mouth daily 90 tablet 3    rosuvastatin (CRESTOR) 40 MG tablet Take 1 tablet by mouth daily 90 tablet 3    Cholecalciferol (VITAMIN D) 50 MCG (2000 UT) CAPS capsule Take 2,000 Units by mouth daily 90 capsule 3    aspirin 81 MG EC tablet Take 1 tablet by mouth daily      Cetirizine HCl (ZYRTEC PO) Take  by mouth.           Past Medical History:   Diagnosis Date    CAD (coronary artery disease) 4/10    ptca LAD Pelberg stent    Hyperlipidemia      Past Surgical History:   Procedure Laterality Date    APPENDECTOMY

## 2024-04-17 ENCOUNTER — OFFICE VISIT (OUTPATIENT)
Dept: PRIMARY CARE CLINIC | Age: 59
End: 2024-04-17
Payer: COMMERCIAL

## 2024-04-17 VITALS
WEIGHT: 211 LBS | BODY MASS INDEX: 28.58 KG/M2 | TEMPERATURE: 97.3 F | RESPIRATION RATE: 16 BRPM | DIASTOLIC BLOOD PRESSURE: 68 MMHG | HEART RATE: 58 BPM | OXYGEN SATURATION: 98 % | HEIGHT: 72 IN | SYSTOLIC BLOOD PRESSURE: 110 MMHG

## 2024-04-17 DIAGNOSIS — E80.4 GILBERT'S SYNDROME: Primary | ICD-10-CM

## 2024-04-17 DIAGNOSIS — K80.10 CALCULUS OF GALLBLADDER WITH CHRONIC CHOLECYSTITIS WITHOUT OBSTRUCTION: ICD-10-CM

## 2024-04-17 DIAGNOSIS — R73.9 BLOOD GLUCOSE ELEVATED: ICD-10-CM

## 2024-04-17 DIAGNOSIS — M54.10 RADICULAR PAIN OF LOWER EXTREMITY: ICD-10-CM

## 2024-04-17 PROCEDURE — G8427 DOCREV CUR MEDS BY ELIG CLIN: HCPCS | Performed by: INTERNAL MEDICINE

## 2024-04-17 PROCEDURE — G8419 CALC BMI OUT NRM PARAM NOF/U: HCPCS | Performed by: INTERNAL MEDICINE

## 2024-04-17 PROCEDURE — 1036F TOBACCO NON-USER: CPT | Performed by: INTERNAL MEDICINE

## 2024-04-17 PROCEDURE — 99214 OFFICE O/P EST MOD 30 MIN: CPT | Performed by: INTERNAL MEDICINE

## 2024-04-17 PROCEDURE — 3017F COLORECTAL CA SCREEN DOC REV: CPT | Performed by: INTERNAL MEDICINE

## 2024-04-17 RX ORDER — BLOOD-GLUCOSE SENSOR
1 EACH MISCELLANEOUS CONTINUOUS
Qty: 2 EACH | Refills: 12 | Status: SHIPPED | OUTPATIENT
Start: 2024-04-17

## 2024-04-17 SDOH — ECONOMIC STABILITY: FOOD INSECURITY: WITHIN THE PAST 12 MONTHS, YOU WORRIED THAT YOUR FOOD WOULD RUN OUT BEFORE YOU GOT MONEY TO BUY MORE.: NEVER TRUE

## 2024-04-17 SDOH — ECONOMIC STABILITY: FOOD INSECURITY: WITHIN THE PAST 12 MONTHS, THE FOOD YOU BOUGHT JUST DIDN'T LAST AND YOU DIDN'T HAVE MONEY TO GET MORE.: NEVER TRUE

## 2024-04-17 SDOH — ECONOMIC STABILITY: INCOME INSECURITY: HOW HARD IS IT FOR YOU TO PAY FOR THE VERY BASICS LIKE FOOD, HOUSING, MEDICAL CARE, AND HEATING?: NOT HARD AT ALL

## 2024-04-17 ASSESSMENT — PATIENT HEALTH QUESTIONNAIRE - PHQ9
2. FEELING DOWN, DEPRESSED OR HOPELESS: NOT AT ALL
SUM OF ALL RESPONSES TO PHQ QUESTIONS 1-9: 0
1. LITTLE INTEREST OR PLEASURE IN DOING THINGS: NOT AT ALL
SUM OF ALL RESPONSES TO PHQ QUESTIONS 1-9: 0
SUM OF ALL RESPONSES TO PHQ9 QUESTIONS 1 & 2: 0

## 2024-04-17 NOTE — PROGRESS NOTES
alert and oriented to person, place, and time.      Cranial Nerves: No cranial nerve deficit.      Sensory: No sensory deficit.      Motor: No weakness.      Coordination: Coordination normal.      Gait: Gait normal.      Deep Tendon Reflexes: Reflexes normal.   Psychiatric:         Mood and Affect: Mood normal.         Behavior: Behavior normal.         Thought Content: Thought content normal.         Judgment: Judgment normal.                  An electronic signature was used to authenticate this note.    --JAME SOLIS MD

## 2024-04-17 NOTE — PATIENT INSTRUCTIONS
[facility] Financial Resources*  (Call 211 if need more resources.)     [facility] Financial Assistance  What they offer: Financial assistance programs that are designed to assist you in finding resources that may help pay your hospital bill. Please click on the links below to learn more about the financial assistance programs available within our regions.  Phone Number: [phone]  How to apply for the [facility] Financial Assistance Program:       Option 1: To apply for financial assistance, a patient (or their family or other provider) should fill out the Financial Assistance Application. Copies of the Financial Assistance Application and the FAP may be obtained for free by calling the [facility] Customer Service department at [phone]   Option 2: The Financial Assistance Application and policy may be obtained for free by downloading a copy from the [employer] website:  https://www.[employer]/patient-resources/financial-assistance  [facility] Path[employer] Platform Partnership Program  What they offer: If financial strain is hindering your ability to meet health care needs, the [employer] Partnership Program may be able to help. It provides support to patients facing complex health issues and social barriers. These services are provided at no cost.   Eligible Patients  Adults who are at or below 200% poverty level  Uninsured, underinsured, or those at risk of losing health insurance  You may be eligible to receive:  One-on-one support enrolling in Medicaid, Marketplace health care coverage, financial assistance, and other benefit programs  Short-term or long-term case management to support and help connect you to appropriate health care services and community-based services.   Assistance with Primary care and prescription costs   referrals as a partnering agency with [employer] to support those overcoming homelessness and living in extreme poverty.   How to Contact:   Phone: Main line

## 2024-04-19 ASSESSMENT — ENCOUNTER SYMPTOMS
SHORTNESS OF BREATH: 0
VISUAL CHANGE: 0
COUGH: 0
CHEST TIGHTNESS: 0
EYES NEGATIVE: 1
VOMITING: 0
CHANGE IN BOWEL HABIT: 0
SWOLLEN GLANDS: 0
NAUSEA: 1
SORE THROAT: 0
ABDOMINAL PAIN: 0

## 2024-04-27 ENCOUNTER — PATIENT MESSAGE (OUTPATIENT)
Dept: PRIMARY CARE CLINIC | Age: 59
End: 2024-04-27

## 2024-04-29 NOTE — TELEPHONE ENCOUNTER
Nikki Galvez MD, Pain Management, Chidi Gan MD, Neurology   With chronic lower leg pain will check EMG lumbar spine x-ray and refer to interventional spine specialist.   Msg sent to pt'

## 2024-04-29 NOTE — TELEPHONE ENCOUNTER
From: Murphy Mcdermott  To: Dr. Kaylan Argueta  Sent: 4/27/2024 9:36 AM EDT  Subject: Neurology Referral    Hi Dr. Argueta,    I’m unclear about why I’ve been referred to both Dr. Galvez and Dr. Murillo. Can you elaborate?    Murphy

## 2024-05-29 DIAGNOSIS — K21.9 GASTROESOPHAGEAL REFLUX DISEASE, UNSPECIFIED WHETHER ESOPHAGITIS PRESENT: ICD-10-CM

## 2024-05-29 NOTE — TELEPHONE ENCOUNTER
Medication:   Requested Prescriptions     Pending Prescriptions Disp Refills    esomeprazole (NEXIUM) 20 MG delayed release capsule 90 capsule 3     Sig: Take 1 capsule by mouth every morning (before breakfast)        Last Filled:      Patient Phone Number: [phone] (home)     Last appt: 4/17/2024   Next appt: Visit date not found    Last OARRS:        No data to display

## 2024-07-16 DIAGNOSIS — I25.10 CORONARY ARTERY DISEASE INVOLVING NATIVE CORONARY ARTERY OF NATIVE HEART WITHOUT ANGINA PECTORIS: ICD-10-CM

## 2024-07-16 DIAGNOSIS — E78.2 MIXED HYPERLIPIDEMIA: ICD-10-CM

## 2024-07-16 RX ORDER — EZETIMIBE 10 MG/1
10 TABLET ORAL DAILY
Qty: 90 TABLET | Refills: 3 | Status: SHIPPED | OUTPATIENT
Start: 2024-07-16

## 2024-07-16 NOTE — TELEPHONE ENCOUNTER
Medication:   Requested Prescriptions     Pending Prescriptions Disp Refills    ezetimibe (ZETIA) 10 MG tablet 90 tablet 3     Sig: Take 1 tablet by mouth daily        Last Filled:      Patient Phone Number: [phone] (home)     Last appt: 4/17/2024   Next appt: Visit date not found    Last OARRS:        No data to display

## 2024-09-27 DIAGNOSIS — E78.2 MIXED HYPERLIPIDEMIA: Primary | ICD-10-CM

## 2024-09-27 DIAGNOSIS — M79.10 MYALGIA: ICD-10-CM

## 2024-10-26 DIAGNOSIS — M79.10 MYALGIA: ICD-10-CM

## 2024-10-26 DIAGNOSIS — E78.2 MIXED HYPERLIPIDEMIA: ICD-10-CM

## 2024-10-26 LAB
ALBUMIN SERPL-MCNC: 4.1 G/DL (ref 3.4–5)
ALBUMIN/GLOB SERPL: 2.1 {RATIO} (ref 1.1–2.2)
ALP SERPL-CCNC: 91 U/L (ref 40–129)
ALT SERPL-CCNC: 20 U/L (ref 10–40)
ANION GAP SERPL CALCULATED.3IONS-SCNC: 10 MMOL/L (ref 3–16)
AST SERPL-CCNC: 25 U/L (ref 15–37)
BASOPHILS # BLD: 0 K/UL (ref 0–0.2)
BASOPHILS NFR BLD: 0.5 %
BILIRUB SERPL-MCNC: 1 MG/DL (ref 0–1)
BUN SERPL-MCNC: 14 MG/DL (ref 7–20)
CALCIUM SERPL-MCNC: 9.3 MG/DL (ref 8.3–10.6)
CHLORIDE SERPL-SCNC: 105 MMOL/L (ref 99–110)
CHOLEST SERPL-MCNC: 263 MG/DL (ref 0–199)
CK SERPL-CCNC: 102 U/L (ref 39–308)
CO2 SERPL-SCNC: 24 MMOL/L (ref 21–32)
CREAT SERPL-MCNC: 0.9 MG/DL (ref 0.9–1.3)
DEPRECATED RDW RBC AUTO: 14.1 % (ref 12.4–15.4)
EOSINOPHIL # BLD: 0.1 K/UL (ref 0–0.6)
EOSINOPHIL NFR BLD: 1.2 %
GFR SERPLBLD CREATININE-BSD FMLA CKD-EPI: >90 ML/MIN/{1.73_M2}
GLUCOSE SERPL-MCNC: 102 MG/DL (ref 70–99)
HCT VFR BLD AUTO: 43 % (ref 40.5–52.5)
HDLC SERPL-MCNC: 50 MG/DL (ref 40–60)
HGB BLD-MCNC: 14.6 G/DL (ref 13.5–17.5)
LDLC SERPL CALC-MCNC: 199 MG/DL
LYMPHOCYTES # BLD: 2.1 K/UL (ref 1–5.1)
LYMPHOCYTES NFR BLD: 36.4 %
MCH RBC QN AUTO: 27.3 PG (ref 26–34)
MCHC RBC AUTO-ENTMCNC: 33.9 G/DL (ref 31–36)
MCV RBC AUTO: 80.7 FL (ref 80–100)
MONOCYTES # BLD: 0.5 K/UL (ref 0–1.3)
MONOCYTES NFR BLD: 8.4 %
NEUTROPHILS # BLD: 3.1 K/UL (ref 1.7–7.7)
NEUTROPHILS NFR BLD: 53.5 %
PLATELET # BLD AUTO: 217 K/UL (ref 135–450)
PMV BLD AUTO: 8.6 FL (ref 5–10.5)
POTASSIUM SERPL-SCNC: 4.3 MMOL/L (ref 3.5–5.1)
PROT SERPL-MCNC: 6.1 G/DL (ref 6.4–8.2)
RBC # BLD AUTO: 5.33 M/UL (ref 4.2–5.9)
SODIUM SERPL-SCNC: 139 MMOL/L (ref 136–145)
TRIGL SERPL-MCNC: 71 MG/DL (ref 0–150)
VLDLC SERPL CALC-MCNC: 14 MG/DL
WBC # BLD AUTO: 5.9 K/UL (ref 4–11)

## 2024-10-27 DIAGNOSIS — R73.9 BLOOD GLUCOSE ELEVATED: ICD-10-CM

## 2024-10-27 DIAGNOSIS — Z95.5 PRESENCE OF DRUG COATED STENT IN LEFT CIRCUMFLEX CORONARY ARTERY: Primary | ICD-10-CM

## 2024-10-27 RX ORDER — ATORVASTATIN CALCIUM 20 MG/1
20 TABLET, FILM COATED ORAL DAILY
Qty: 90 TABLET | Refills: 1 | Status: SHIPPED | OUTPATIENT
Start: 2024-10-27

## 2024-10-27 NOTE — RESULT ENCOUNTER NOTE
Normal kidney and liver test.  Mild increase blood sugar so will order an A1c, to do when you repeat your cholesterol tests.     I need to put you on another cholesterol medication.  Your medication worked well in controlling the cholesterol but we had to discontinue it to to muscle pain.  Now the cholesterol profile is unfavorable.  It looks like you tolerated the rosuvastatin 20 mg well since 2012 and  a few years after the dose was increased to 40 mg you had muscle pain that resolved with discontinuation.   I want you to continue zetia and I am adding atorvastatin at 20 mg daily.   After taking medication for a month go to lab for a muscle test, blood glucose test ( A1C ) and fasting lipid.  It is important to get the cholesterol down again to prevent cholesterol build up in your heart arteries.   Order placed, go for blood tests fasting after taking the atorvastatin and zetia for one month.

## 2024-11-10 ENCOUNTER — PATIENT MESSAGE (OUTPATIENT)
Dept: PRIMARY CARE CLINIC | Age: 59
End: 2024-11-10

## 2024-11-10 DIAGNOSIS — Z95.5 PRESENCE OF DRUG COATED STENT IN LEFT CIRCUMFLEX CORONARY ARTERY: ICD-10-CM

## 2024-11-11 NOTE — TELEPHONE ENCOUNTER
Medication:   Requested Prescriptions     Pending Prescriptions Disp Refills    atorvastatin (LIPITOR) 20 MG tablet 90 tablet 1     Sig: Take 1 tablet by mouth daily        Last Filled:      Patient Phone Number: [phone] (home)     Last appt: 4/17/2024   Next appt: Visit date not found    Last OARRS:        No data to display

## 2024-11-13 RX ORDER — ATORVASTATIN CALCIUM 20 MG/1
20 TABLET, FILM COATED ORAL DAILY
Qty: 90 TABLET | Refills: 1 | Status: SHIPPED | OUTPATIENT
Start: 2024-11-13

## 2025-05-23 DIAGNOSIS — E78.2 MIXED HYPERLIPIDEMIA: Primary | ICD-10-CM

## 2025-05-23 RX ORDER — BEMPEDOIC ACID AND EZETIMIBE 180; 10 MG/1; MG/1
1 TABLET, FILM COATED ORAL DAILY
Qty: 30 TABLET | Refills: 12 | Status: SHIPPED | OUTPATIENT
Start: 2025-05-23

## 2025-05-29 DIAGNOSIS — Z12.5 PROSTATE CANCER SCREENING: ICD-10-CM

## 2025-05-29 DIAGNOSIS — E78.2 MIXED HYPERLIPIDEMIA: Primary | ICD-10-CM

## 2025-05-29 DIAGNOSIS — E78.2 MIXED HYPERLIPIDEMIA: ICD-10-CM

## 2025-05-29 DIAGNOSIS — Z00.00 ENCOUNTER FOR PREVENTATIVE ADULT HEALTH CARE EXAMINATION: Primary | ICD-10-CM

## 2025-05-29 DIAGNOSIS — N40.0 ENLARGED PROSTATE: ICD-10-CM

## 2025-05-30 DIAGNOSIS — K21.9 GASTROESOPHAGEAL REFLUX DISEASE, UNSPECIFIED WHETHER ESOPHAGITIS PRESENT: ICD-10-CM

## 2025-07-08 ENCOUNTER — HOSPITAL ENCOUNTER (OUTPATIENT)
Age: 60
Discharge: HOME | End: 2025-07-08
Payer: COMMERCIAL

## 2025-07-08 DIAGNOSIS — N40.0 ENLARGED PROSTATE: ICD-10-CM

## 2025-07-08 DIAGNOSIS — Z95.5 PRESENCE OF DRUG COATED STENT IN LEFT CIRCUMFLEX CORONARY ARTERY: ICD-10-CM

## 2025-07-08 DIAGNOSIS — Z12.5 PROSTATE CANCER SCREENING: ICD-10-CM

## 2025-07-08 DIAGNOSIS — Z00.00 ENCOUNTER FOR PREVENTATIVE ADULT HEALTH CARE EXAMINATION: ICD-10-CM

## 2025-07-08 DIAGNOSIS — E78.2 MIXED HYPERLIPIDEMIA: ICD-10-CM

## 2025-07-08 DIAGNOSIS — R73.9 BLOOD GLUCOSE ELEVATED: ICD-10-CM

## 2025-07-08 LAB
ALBUMIN SERPL-MCNC: 4.3 G/DL (ref 3.4–5)
ALBUMIN/GLOB SERPL: 1.9 {RATIO} (ref 1.1–2.2)
ALP SERPL-CCNC: 70 U/L (ref 40–129)
ALT SERPL-CCNC: 28 U/L (ref 10–40)
ANION GAP SERPL CALCULATED.3IONS-SCNC: 11 MMOL/L (ref 3–16)
AST SERPL-CCNC: 35 U/L (ref 15–37)
BASOPHILS # BLD: 0 K/UL (ref 0–0.2)
BASOPHILS NFR BLD: 0.6 %
BILIRUB DIRECT SERPL-MCNC: 0.4 MG/DL (ref 0–0.3)
BILIRUB INDIRECT SERPL-MCNC: 0.5 MG/DL (ref 0–1)
BILIRUB SERPL-MCNC: 0.9 MG/DL (ref 0–1)
BUN SERPL-MCNC: 19 MG/DL (ref 7–20)
CALCIUM SERPL-MCNC: 9.3 MG/DL (ref 8.3–10.6)
CHLORIDE SERPL-SCNC: 104 MMOL/L (ref 99–110)
CHOLEST SERPL-MCNC: 117 MG/DL (ref 0–199)
CK SERPL-CCNC: 131 U/L (ref 39–308)
CO2 SERPL-SCNC: 25 MMOL/L (ref 21–32)
CREAT SERPL-MCNC: 1 MG/DL (ref 0.9–1.3)
DEPRECATED RDW RBC AUTO: 13.9 % (ref 12.4–15.4)
EOSINOPHIL # BLD: 0.1 K/UL (ref 0–0.6)
EOSINOPHIL NFR BLD: 1.6 %
EST. AVERAGE GLUCOSE BLD GHB EST-MCNC: 119.8 MG/DL
FOLATE SERPL-MCNC: 22.7 NG/ML (ref 4.78–24.2)
GFR SERPLBLD CREATININE-BSD FMLA CKD-EPI: 86 ML/MIN/{1.73_M2}
GLUCOSE SERPL-MCNC: 112 MG/DL (ref 70–99)
HBA1C MFR BLD: 5.8 %
HCT VFR BLD AUTO: 43.7 % (ref 40.5–52.5)
HCYS SERPL-SCNC: 11 UMOL/L (ref 0–10)
HDLC SERPL-MCNC: 45 MG/DL (ref 40–60)
HGB BLD-MCNC: 15.3 G/DL (ref 13.5–17.5)
LDL CHOLESTEROL: 56 MG/DL
LYMPHOCYTES # BLD: 1.9 K/UL (ref 1–5.1)
LYMPHOCYTES NFR BLD: 30 %
MCH RBC QN AUTO: 27.6 PG (ref 26–34)
MCHC RBC AUTO-ENTMCNC: 35 G/DL (ref 31–36)
MCV RBC AUTO: 78.7 FL (ref 80–100)
MONOCYTES # BLD: 0.6 K/UL (ref 0–1.3)
MONOCYTES NFR BLD: 9.8 %
NEUTROPHILS # BLD: 3.7 K/UL (ref 1.7–7.7)
NEUTROPHILS NFR BLD: 58 %
PLATELET # BLD AUTO: 236 K/UL (ref 135–450)
PMV BLD AUTO: 9 FL (ref 5–10.5)
POTASSIUM SERPL-SCNC: 4.4 MMOL/L (ref 3.5–5.1)
PROT SERPL-MCNC: 6.6 G/DL (ref 6.4–8.2)
PSA SERPL DL<=0.01 NG/ML-MCNC: 1.51 NG/ML (ref 0–4)
RBC # BLD AUTO: 5.55 M/UL (ref 4.2–5.9)
SODIUM SERPL-SCNC: 140 MMOL/L (ref 136–145)
TRIGL SERPL-MCNC: 81 MG/DL (ref 0–150)
TSH SERPL DL<=0.005 MIU/L-ACNC: 3.86 UIU/ML (ref 0.27–4.2)
VIT B12 SERPL-MCNC: 973 PG/ML (ref 211–911)
VLDLC SERPL CALC-MCNC: 16 MG/DL
WBC # BLD AUTO: 6.3 K/UL (ref 4–11)

## 2025-07-08 PROCEDURE — 85025 COMPLETE CBC W/AUTO DIFF WBC: CPT

## 2025-07-08 PROCEDURE — 36415 COLL VENOUS BLD VENIPUNCTURE: CPT

## 2025-07-08 PROCEDURE — 82248 BILIRUBIN DIRECT: CPT

## 2025-07-08 PROCEDURE — 80053 COMPREHEN METABOLIC PANEL: CPT

## 2025-07-08 PROCEDURE — 82607 VITAMIN B-12: CPT

## 2025-07-08 PROCEDURE — 82746 ASSAY OF FOLIC ACID SERUM: CPT

## 2025-07-08 PROCEDURE — 82550 ASSAY OF CK (CPK): CPT

## 2025-07-08 PROCEDURE — 83090 ASSAY OF HOMOCYSTEINE: CPT

## 2025-07-08 PROCEDURE — 83036 HEMOGLOBIN GLYCOSYLATED A1C: CPT

## 2025-07-08 PROCEDURE — 84153 ASSAY OF PSA TOTAL: CPT

## 2025-07-08 PROCEDURE — 82172 ASSAY OF APOLIPOPROTEIN: CPT

## 2025-07-08 PROCEDURE — 84443 ASSAY THYROID STIM HORMONE: CPT

## 2025-07-08 PROCEDURE — 82985 ASSAY OF GLYCATED PROTEIN: CPT

## 2025-07-08 PROCEDURE — 80061 LIPID PANEL: CPT

## 2025-07-09 ENCOUNTER — OFFICE VISIT (OUTPATIENT)
Dept: PRIMARY CARE CLINIC | Age: 60
End: 2025-07-09

## 2025-07-09 VITALS
OXYGEN SATURATION: 97 % | SYSTOLIC BLOOD PRESSURE: 124 MMHG | DIASTOLIC BLOOD PRESSURE: 70 MMHG | RESPIRATION RATE: 18 BRPM | WEIGHT: 209 LBS | HEIGHT: 72 IN | HEART RATE: 68 BPM | BODY MASS INDEX: 28.31 KG/M2

## 2025-07-09 DIAGNOSIS — R73.03 PREDIABETES: Primary | ICD-10-CM

## 2025-07-09 DIAGNOSIS — R17 ELEVATED BILIRUBIN: ICD-10-CM

## 2025-07-09 RX ORDER — EVOLOCUMAB 140 MG/ML
140 INJECTION, SOLUTION SUBCUTANEOUS
COMMUNITY
Start: 2025-01-30

## 2025-07-09 RX ORDER — METFORMIN HYDROCHLORIDE 500 MG/1
500 TABLET, EXTENDED RELEASE ORAL
Qty: 90 TABLET | Refills: 1 | Status: SHIPPED | OUTPATIENT
Start: 2025-07-09

## 2025-07-09 RX ORDER — GLUCOSA SU 2KCL/CHONDROITIN SU 500-400 MG
1 CAPSULE ORAL DAILY
COMMUNITY

## 2025-07-09 SDOH — ECONOMIC STABILITY: FOOD INSECURITY: WITHIN THE PAST 12 MONTHS, THE FOOD YOU BOUGHT JUST DIDN'T LAST AND YOU DIDN'T HAVE MONEY TO GET MORE.: NEVER TRUE

## 2025-07-09 SDOH — ECONOMIC STABILITY: FOOD INSECURITY: WITHIN THE PAST 12 MONTHS, YOU WORRIED THAT YOUR FOOD WOULD RUN OUT BEFORE YOU GOT MONEY TO BUY MORE.: NEVER TRUE

## 2025-07-09 ASSESSMENT — PATIENT HEALTH QUESTIONNAIRE - PHQ9
2. FEELING DOWN, DEPRESSED OR HOPELESS: SEVERAL DAYS
SUM OF ALL RESPONSES TO PHQ9 QUESTIONS 1 & 2: 2
1. LITTLE INTEREST OR PLEASURE IN DOING THINGS: SEVERAL DAYS
2. FEELING DOWN, DEPRESSED OR HOPELESS: SEVERAL DAYS
SUM OF ALL RESPONSES TO PHQ QUESTIONS 1-9: 2
1. LITTLE INTEREST OR PLEASURE IN DOING THINGS: SEVERAL DAYS

## 2025-07-10 LAB
APO B100 SERPL-MCNC: 49 MG/DL (ref 66–133)
FRUCTOSAMINE SERPL-SCNC: 262 UMOL/L (ref 205–285)

## 2025-07-21 ENCOUNTER — TELEPHONE (OUTPATIENT)
Dept: PRIMARY CARE CLINIC | Age: 60
End: 2025-07-21

## 2025-07-23 RX ORDER — DICYCLOMINE HYDROCHLORIDE 10 MG/1
CAPSULE ORAL
Qty: 2 CAPSULE | Refills: 0 | Status: SHIPPED | OUTPATIENT
Start: 2025-07-23